# Patient Record
Sex: FEMALE | Race: BLACK OR AFRICAN AMERICAN | NOT HISPANIC OR LATINO | Employment: OTHER | URBAN - METROPOLITAN AREA
[De-identification: names, ages, dates, MRNs, and addresses within clinical notes are randomized per-mention and may not be internally consistent; named-entity substitution may affect disease eponyms.]

---

## 2017-01-18 ENCOUNTER — OFFICE VISIT (OUTPATIENT)
Dept: OPHTHALMOLOGY | Facility: CLINIC | Age: 67
End: 2017-01-18
Payer: MEDICARE

## 2017-01-18 DIAGNOSIS — Z96.1 PSEUDOPHAKIA OF BOTH EYES: ICD-10-CM

## 2017-01-18 DIAGNOSIS — H04.123 DRY EYES, BILATERAL: ICD-10-CM

## 2017-01-18 DIAGNOSIS — H40.1132 PRIMARY OPEN ANGLE GLAUCOMA OF BOTH EYES, MODERATE STAGE: Primary | ICD-10-CM

## 2017-01-18 PROCEDURE — 99999 PR PBB SHADOW E&M-EST. PATIENT-LVL II: CPT | Mod: PBBFAC,,, | Performed by: OPHTHALMOLOGY

## 2017-01-18 PROCEDURE — 92012 INTRM OPH EXAM EST PATIENT: CPT | Mod: S$GLB,,, | Performed by: OPHTHALMOLOGY

## 2017-01-18 RX ORDER — KETOROLAC TROMETHAMINE 5 MG/ML
SOLUTION OPHTHALMIC
Qty: 1 BOTTLE | Refills: 1 | Status: SHIPPED | OUTPATIENT
Start: 2017-01-18 | End: 2018-02-22 | Stop reason: ALTCHOICE

## 2017-01-18 NOTE — PROGRESS NOTES
SUBJECTIVE:   Gris Jin is a 66 y.o. female   Uncorrected distance visual acuity was 20/25 in the right eye and 20/20 -2 in the left eye.   Chief Complaint   Patient presents with    Glaucoma    Dry Eye        HPI:  HPI     Here for IOP check and dry eye check.  She states she didn't try Xiidra   due to cost.(300).  She states vision gets blurry after reading for a   time.  She thinks she should use Systane more often, but she forgets.      1. Mod COAG OS>OD Goal=15-16  SLT OD 8/07 (25-16)  2. Dry eyes  3. Restor IOL OU w/YAG OS (readers for fine print)  4. DM x 2010    Timolol BID OU  Xalatan QHS OU  Systane PRN OU  O3FO       Last edited by Celi Sanders on 1/18/2017  4:39 PM.     Assessment /Plan :  1. Primary open angle glaucoma of both eyes, moderate stage IOP not within acceptable range relative to target IOP with risk of irreversible visual loss. Better IOP control is recommended. Discussed options, risks, and benefits of additional medication, SLT laser, and/or incisional glaucoma surgery. Reviewed importance of continued compliance with treatment and follow up.     Patient chooses schedule SLT  OU     2. Pseudophakia of both eyes doing well   3.      Dry Eyes continue artificial tears prn OU    Return for the SLT OU

## 2017-01-18 NOTE — MR AVS SNAPSHOT
[facility] - Ophthalmology  [address] [facility] [place] [address]  Phone: [phone]  Fax: [phone]                  Gris Jin   2017 4:00 PM   Office Visit    Description:  Female : 1950   Provider:  Harrison Treadwell MD   Department:  [facility] - Ophthalmology           Reason for Visit     Glaucoma     Dry Eye           Diagnoses this Visit        Comments    Primary open angle glaucoma of both eyes, moderate stage    -  Primary     Pseudophakia of both eyes         Dry eyes, bilateral                To Do List           Goals (5 Years of Data)     None       These Medications        Disp Refills Start End    ketorolac 0.5% (ACULAR) 0.5 % Drop 1 Bottle 1 2017     Put one eyedrop in both eye four times a day. Start first drop morning of laser and stop after five days.    Pharmacy: [facility] [address] [place] [address] [address] [facility] #: [phone]         [facility] On Call     [facility] Care [facility] -  Assistance  Registered nurses in the [facility] On Call Center provide clinical advisement, health education, appointment booking, and other advisory services.  Call for this free service at [phone].             Medications           Message regarding Medications     Verify the changes and/or additions to your medication regime listed below are the same as discussed with your clinician today.  If any of these changes or additions are incorrect, please notify your healthcare provider.        START taking these NEW medications        Refills    ketorolac 0.5% (ACULAR) 0.5 % Drop 1    Sig: Put one eyedrop in both eye four times a day. Start first drop morning of laser and stop after five days.    Class: Normal           Verify that the below list of medications is an accurate representation of the medications you are currently taking.  If none reported, the list may be blank. If incorrect, please contact your healthcare provider. Carry this list  with you in case of emergency.           Current Medications     aspirin (ECOTRIN) 81 MG EC tablet Take 81 mg by mouth.    escitalopram oxalate (LEXAPRO) 10 MG tablet     fish oil-omega-3 fatty acids 300-1,000 mg capsule Take 2 g by mouth once daily.    latanoprost 0.005 % ophthalmic solution Place 1 drop into both eyes every evening.    lisinopril 10 MG tablet Take 5 mg by mouth.    losartan (COZAAR) 25 MG tablet Take 12.5 mg by mouth.    lovastatin (MEVACOR) 20 MG tablet     metformin (GLUCOPHAGE) 1000 MG tablet     pravastatin (PRAVACHOL) 20 MG tablet Take 20 mg by mouth.    sitagliptin-metformin 50-1,000 mg TM24 Take by mouth.    timolol maleate 0.5% (TIMOPTIC) 0.5 % Drop Place 1 drop into both eyes 2 (two) times daily.    zolpidem (AMBIEN) 10 mg Tab     ketorolac 0.5% (ACULAR) 0.5 % Drop Put one eyedrop in both eye four times a day. Start first drop morning of laser and stop after five days.    lifitegrast 5 % Dpet Apply 1 drop to eye 2 (two) times daily.           Clinical Reference Information           Allergies as of 1/18/2017     No Known Allergies      Immunizations Administered on Date of Encounter - 1/18/2017     None      Orders Placed During Today's Visit      Normal Orders This Visit    Ambulatory Referral to External Surgery  for SLT OU

## 2017-03-15 ENCOUNTER — OFFICE VISIT (OUTPATIENT)
Dept: OPHTHALMOLOGY | Facility: CLINIC | Age: 67
End: 2017-03-15
Payer: MEDICARE

## 2017-03-15 DIAGNOSIS — H40.1132 PRIMARY OPEN ANGLE GLAUCOMA OF BOTH EYES, MODERATE STAGE: Primary | ICD-10-CM

## 2017-03-15 DIAGNOSIS — Z96.1 PSEUDOPHAKIA OF BOTH EYES: ICD-10-CM

## 2017-03-15 PROCEDURE — 92012 INTRM OPH EXAM EST PATIENT: CPT | Mod: S$GLB,,, | Performed by: OPHTHALMOLOGY

## 2017-03-15 PROCEDURE — 99999 PR PBB SHADOW E&M-EST. PATIENT-LVL II: CPT | Mod: PBBFAC,,, | Performed by: OPHTHALMOLOGY

## 2017-03-15 NOTE — MR AVS SNAPSHOT
[facility] - Ophthalmology  [address] [facility] [place] [address]  Phone: [phone]  Fax: [phone]                  Gris Jin   3/15/2017 4:00 PM   Office Visit    Description:  Female : 1950   Provider:  Harrison Treadwell MD   Department:  [facility] - Ophthalmology           Reason for Visit     Glaucoma           Diagnoses this Visit        Comments    Primary open angle glaucoma of both eyes, moderate stage    -  Primary     Pseudophakia of both eyes                To Do List           Goals (5 Years of Data)     None      [facility]     [facility] Nurse Care [facility] -  Assistance  Registered nurses in the [facility] On Call Center provide clinical advisement, health education, appointment booking, and other advisory services.  Call for this free service at [phone].             Medications           Message regarding Medications     Verify the changes and/or additions to your medication regime listed below are the same as discussed with your clinician today.  If any of these changes or additions are incorrect, please notify your healthcare provider.             Verify that the below list of medications is an accurate representation of the medications you are currently taking.  If none reported, the list may be blank. If incorrect, please contact your healthcare provider. Carry this list with you in case of emergency.           Current Medications     aspirin (ECOTRIN) 81 MG EC tablet Take 81 mg by mouth.    fish oil-omega-3 fatty acids 300-1,000 mg capsule Take 2 g by mouth once daily.    latanoprost 0.005 % ophthalmic solution Place 1 drop into both eyes every evening.    lisinopril 10 MG tablet Take 5 mg by mouth.    losartan (COZAAR) 25 MG tablet Take 12.5 mg by mouth.    lovastatin (MEVACOR) 20 MG tablet     metformin (GLUCOPHAGE) 1000 MG tablet     timolol maleate 0.5% (TIMOPTIC) 0.5 % Drop Place 1 drop into both eyes 2 (two) times daily.    zolpidem  (AMBIEN) 10 mg Tab     escitalopram oxalate (LEXAPRO) 10 MG tablet     ketorolac 0.5% (ACULAR) 0.5 % Drop Put one eyedrop in both eye four times a day. Start first drop morning of laser and stop after five days.    lifitegrast 5 % Dpet Apply 1 drop to eye 2 (two) times daily.    pravastatin (PRAVACHOL) 20 MG tablet Take 20 mg by mouth.    sitagliptin-metformin 50-1,000 mg TM24 Take by mouth.           Clinical Reference Information           Allergies as of 3/15/2017     No Known Allergies      Immunizations Administered on Date of Encounter - 3/15/2017     None      [facility] Sign-Up     Activating your [facility] account is as easy as 1-2-3!     1) Visit [email or web address], select Sign Up Now, enter this activation code and your date of birth, then select Next.  Activation code not generated  Current Patient Portal Status: Account disabled      2) Create a username and password to use when you visit [facility] in the future and select a security question in case you lose your password and select Next.    3) Enter your e-mail address and click Sign Up!    Additional Information  If you have questions, please e-mail [email or web address] or call [phone] to talk to our [facility] staff. Remember, [facility] is NOT to be used for urgent needs. For medical emergencies, dial 911.         Language Assistance Services     ATTENTION: Language assistance services are available, free of charge. Please call [phone].      ATENCIÓN: Si habla español, tiene a mayes disposición servicios gratuitos de asistencia lingüística. Llame al [phone].     CHÚ Ý: N?u b?n nói Ti?ng Vi?t, có các d?ch v? h? tr? ngôn ng? mi?n phí dành cho b?n. G?i s? [phone].         [facility] - [facility] complies with applicable Federal civil rights laws and does not discriminate on the basis of race, color, national origin, age, disability, or sex.

## 2017-03-15 NOTE — PROGRESS NOTES
SUBJECTIVE:   Gris Jin is a 66 y.o. female   Uncorrected distance visual acuity was 20/25 in the right eye and 20/20 in the left eye.   Chief Complaint   Patient presents with    Glaucoma     5 wk s/p SLT OU         HPI:  HPI     Glaucoma    Additional comments: 5 wk s/p SLT OU            Comments   Pt has no complaints of pain or discomfort. VA stable. 100% compliant with   gtts.    Brittany Chao NP    1. Mod COAG OS>OD Goal=15-16  SLT OD 8/07 (25-16)  SLT OU 2/15/17  2. Dry eyes  3. Restor IOL OU w/YAG OS (readers for fine print)  4. DM x 2010    Timolol BID OU  Xalatan QHS OU  Systane PRN OU  O3FO       Last edited by Jayce Coto, Patient Care Assistant on 3/15/2017  4:05   PM. (History)        Assessment /Plan :  1. Primary open angle glaucoma of both eyes, moderate stage  Nice response to the SLT OU  Doing well, IOP within acceptable range relative to target IOP and no evidence of progression. Continue current treatment. Reviewed importance of continued compliance with treatment and follow up.      2. Pseudophakia of both eyes doing well       Return to clinic in 3 months  or as needed.  With 24-2 HVF, Dilation and SDP's

## 2017-04-01 DIAGNOSIS — H40.1190 CHRONIC OPEN ANGLE GLAUCOMA: ICD-10-CM

## 2017-04-03 RX ORDER — LATANOPROST 50 UG/ML
SOLUTION/ DROPS OPHTHALMIC
Qty: 1 BOTTLE | Refills: 12 | Status: SHIPPED | OUTPATIENT
Start: 2017-04-03 | End: 2018-02-22 | Stop reason: SDUPTHER

## 2017-04-13 DIAGNOSIS — H40.1190 CHRONIC OPEN ANGLE GLAUCOMA: ICD-10-CM

## 2017-04-17 RX ORDER — LATANOPROST 50 UG/ML
SOLUTION/ DROPS OPHTHALMIC
Qty: 3 ML | Refills: 11 | Status: SHIPPED | OUTPATIENT
Start: 2017-04-17 | End: 2018-09-06

## 2017-05-12 DIAGNOSIS — H40.1190 CHRONIC OPEN ANGLE GLAUCOMA: ICD-10-CM

## 2017-05-12 RX ORDER — TIMOLOL MALEATE 5 MG/ML
SOLUTION/ DROPS OPHTHALMIC
Qty: 5 ML | Refills: 11 | Status: SHIPPED | OUTPATIENT
Start: 2017-05-12 | End: 2018-05-31 | Stop reason: SDUPTHER

## 2017-08-31 ENCOUNTER — OFFICE VISIT (OUTPATIENT)
Dept: OPHTHALMOLOGY | Facility: CLINIC | Age: 67
End: 2017-08-31
Payer: MEDICARE

## 2017-08-31 DIAGNOSIS — H57.89 IRRITATION OF BOTH EYES: Primary | ICD-10-CM

## 2017-08-31 PROCEDURE — 92012 INTRM OPH EXAM EST PATIENT: CPT | Mod: S$GLB,,, | Performed by: OPTOMETRIST

## 2017-08-31 PROCEDURE — 99999 PR PBB SHADOW E&M-EST. PATIENT-LVL II: CPT | Mod: PBBFAC,,, | Performed by: OPTOMETRIST

## 2017-08-31 NOTE — PROGRESS NOTES
HPI     Last CPG exam 03/15/2017  On yesterday patient placed liquid paper solution in both eye  Patient rinsed eyes and went to the ER to be checked  Patient is coming today to make sure that no damage was done to the eyes    Diabetic   COAG  Pseudophakia, OU    MLC:      Last edited by EFFIE Díaz, OD on 8/31/2017  2:47 PM. (History)            Assessment /Plan     For exam results, see Encounter Report.    Irritation of both eyes        No signs/symptoms now from getting the Liquid Paper in her eyes.  All is white and quiet now.  RTC prn.

## 2018-02-22 ENCOUNTER — OFFICE VISIT (OUTPATIENT)
Dept: OPHTHALMOLOGY | Facility: CLINIC | Age: 68
End: 2018-02-22
Payer: MEDICARE

## 2018-02-22 DIAGNOSIS — E11.9 DIABETES MELLITUS TYPE 2 WITHOUT RETINOPATHY: ICD-10-CM

## 2018-02-22 DIAGNOSIS — H40.1132 PRIMARY OPEN ANGLE GLAUCOMA OF BOTH EYES, MODERATE STAGE: Primary | ICD-10-CM

## 2018-02-22 DIAGNOSIS — Z96.1 PSEUDOPHAKIA OF BOTH EYES: ICD-10-CM

## 2018-02-22 PROCEDURE — 92250 FUNDUS PHOTOGRAPHY W/I&R: CPT | Mod: S$GLB,,, | Performed by: OPHTHALMOLOGY

## 2018-02-22 PROCEDURE — 92014 COMPRE OPH EXAM EST PT 1/>: CPT | Mod: S$GLB,,, | Performed by: OPHTHALMOLOGY

## 2018-02-22 PROCEDURE — 92083 EXTENDED VISUAL FIELD XM: CPT | Mod: S$GLB,,, | Performed by: OPHTHALMOLOGY

## 2018-02-22 PROCEDURE — 99999 PR PBB SHADOW E&M-EST. PATIENT-LVL II: CPT | Mod: PBBFAC,,, | Performed by: OPHTHALMOLOGY

## 2018-02-22 NOTE — LETTER
[facility] - Ophthalmology  [address]  [place] [address]  Phone: [phone]  Fax: [phone]   February 22, 2018    Kodak Guerra MD  [address]  [facility] [place] [address]    Patient: Gris Jin   MR Number: [id number]   YOB: 1950   Date of Visit: 2/22/2018       Dear Dr. Guerra :    Thank you for referring Gris Jin to me for evaluation. Here is my assessment and plan of care:          1. Primary open angle glaucoma of both eyes, moderate stage Doing well, IOP within acceptable range relative to target IOP and no evidence of progression. Continue current treatment. Reviewed importance of continued compliance with treatment and follow up.     2. Pseudophakia of both eyes  -- Condition stable, no therapeutic change required. Monitoring routinely.     3. Diabetes mellitus type 2 without retinopathy No diabetic retinopathy at this time. Reviewed diabetic eye precautions including avoiding tobacco use,  Good glucose control, and importance of regular follow up.        Return to clinic in 6 months  or as needed.  With IOP Check and GOCT          If you have questions, please do not hesitate to call me. I look forward to following Ms. Gris Jin along with you.    Sincerely,        Harrison Treadwell MD       CC  No Recipients

## 2018-02-22 NOTE — PROGRESS NOTES
SUBJECTIVE:   Gris Jin is a 67 y.o. female   Uncorrected distance visual acuity was 20/25 in the right eye and 20/20 in the left eye.   Chief Complaint   Patient presents with    Glaucoma     3 month HVF/FD, Timolol BID OU, Xalatan QHS OU        HPI:  HPI     Glaucoma    Additional comments: 3 month HVF/FD, Timolol BID OU, Xalatan QHS OU           Comments       Brittany Guerra PCP    1. Mod COAG OS>OD Goal=15-16  SLT OD 8/07 (25-16) + 2/15/17 (17.5-15.5)  SLT OS 2/15/17 (18-16)  2. Dry eyes  3. Restor IOL OU w/YAG OS (readers for fine print)  4. DM x 2010    Timolol BID OU  Xalatan QHS OU  Systane PRN OU  O3FO       Last edited by Dajuan Fried on 2/22/2018  2:21 PM. (History)        Assessment /Plan :  1. Primary open angle glaucoma of both eyes, moderate stage Doing well, IOP within acceptable range relative to target IOP and no evidence of progression. Continue current treatment. Reviewed importance of continued compliance with treatment and follow up.     2. Pseudophakia of both eyes  -- Condition stable, no therapeutic change required. Monitoring routinely.     3. Diabetes mellitus type 2 without retinopathy No diabetic retinopathy at this time. Reviewed diabetic eye precautions including avoiding tobacco use,  Good glucose control, and importance of regular follow up.        Return to clinic in 6 months  or as needed.  With IOP Check and GOCT

## 2018-04-13 DIAGNOSIS — H40.1190 CHRONIC OPEN ANGLE GLAUCOMA: ICD-10-CM

## 2018-04-13 RX ORDER — LATANOPROST 50 UG/ML
SOLUTION/ DROPS OPHTHALMIC
Qty: 1 BOTTLE | Refills: 12 | Status: SHIPPED | OUTPATIENT
Start: 2018-04-13 | End: 2018-08-22 | Stop reason: SDUPTHER

## 2018-05-31 DIAGNOSIS — H40.1190 CHRONIC OPEN ANGLE GLAUCOMA: ICD-10-CM

## 2018-05-31 RX ORDER — TIMOLOL MALEATE 5 MG/ML
SOLUTION/ DROPS OPHTHALMIC
Qty: 5 ML | Refills: 11 | Status: SHIPPED | OUTPATIENT
Start: 2018-05-31 | End: 2019-06-03 | Stop reason: SDUPTHER

## 2018-08-22 ENCOUNTER — OFFICE VISIT (OUTPATIENT)
Dept: OPHTHALMOLOGY | Facility: CLINIC | Age: 68
End: 2018-08-22
Payer: MEDICARE

## 2018-08-22 DIAGNOSIS — H40.1132 PRIMARY OPEN ANGLE GLAUCOMA OF BOTH EYES, MODERATE STAGE: Primary | ICD-10-CM

## 2018-08-22 DIAGNOSIS — H04.123 DRY EYES, BILATERAL: ICD-10-CM

## 2018-08-22 DIAGNOSIS — Z96.1 PSEUDOPHAKIA OF BOTH EYES: ICD-10-CM

## 2018-08-22 PROCEDURE — 99999 PR PBB SHADOW E&M-EST. PATIENT-LVL II: CPT | Mod: PBBFAC,,, | Performed by: OPHTHALMOLOGY

## 2018-08-22 PROCEDURE — 92012 INTRM OPH EXAM EST PATIENT: CPT | Mod: S$GLB,,, | Performed by: OPHTHALMOLOGY

## 2018-08-22 PROCEDURE — 92133 CPTRZD OPH DX IMG PST SGM ON: CPT | Mod: S$GLB,,, | Performed by: OPHTHALMOLOGY

## 2018-08-22 RX ORDER — BIMATOPROST 0.1 MG/ML
1 SOLUTION/ DROPS OPHTHALMIC NIGHTLY
Qty: 5 ML | Refills: 11
Start: 2018-08-22 | End: 2018-09-06 | Stop reason: SDUPTHER

## 2018-08-22 NOTE — PROGRESS NOTES
SUBJECTIVE:   Gris Jin is a 67 y.o. female   Uncorrected distance visual acuity was 20/30 -2 in the right eye and 20/25 +2 in the left eye.   Chief Complaint   Patient presents with    Glaucoma     6 month GOCT and IOP check, Timolol BID OU, Xalatan QHS OU        HPI:  HPI     Glaucoma      Additional comments: 6 month GOCT and IOP check, Timolol BID OU, Xalatan   QHS OU              Comments     Pt states she's been using her drops as directed. No ocular pain or   irritation. Vision is about the same, only use readers for small print.     Brittany Chao NP  Dr. Kodak Guerra PCP    1. Mod COAG OS>OD Goal=15-16  SLT OD 8/07 (25-16) + 2/15/17 (17.5-15.5)  SLT OS 2/15/17 (18-16)  2. Dry eyes  3. Restor IOL OU w/YAG OS (readers for fine print)  4. DM x 2010    Timolol BID OU  Xalatan QHS OU  Systane PRN OU  O3FO          Last edited by Dajuan Fried on 8/22/2018  2:57 PM. (History)        Assessment /Plan :  1. Primary open angle glaucoma of both eyes, moderate stage IOP not within acceptable range relative to target IOP with risk of irreversible visual loss. Better IOP control is recommended. Discussed options, risks, and benefits of additional medication, SLT laser, and/or incisional glaucoma surgery. Reviewed importance of continued compliance with treatment and follow up.     Patient chooses change Xalatan to lumigan 0.01 qhs ou     2. Pseudophakia of both eyes  -- Condition stable, no therapeutic change required. Monitoring routinely.     3. Dry eyes, bilateral Resume Xiidra BID OU          Return to clinic in 2-3 weeks  or as needed.  With IOP Check

## 2018-09-06 ENCOUNTER — OFFICE VISIT (OUTPATIENT)
Dept: OPHTHALMOLOGY | Facility: CLINIC | Age: 68
End: 2018-09-06
Payer: MEDICARE

## 2018-09-06 DIAGNOSIS — Z96.1 PSEUDOPHAKIA OF BOTH EYES: ICD-10-CM

## 2018-09-06 DIAGNOSIS — H04.123 DRY EYES, BILATERAL: ICD-10-CM

## 2018-09-06 DIAGNOSIS — H40.1132 PRIMARY OPEN ANGLE GLAUCOMA OF BOTH EYES, MODERATE STAGE: Primary | ICD-10-CM

## 2018-09-06 PROCEDURE — 99999 PR PBB SHADOW E&M-EST. PATIENT-LVL I: CPT | Mod: PBBFAC,,, | Performed by: OPHTHALMOLOGY

## 2018-09-06 PROCEDURE — 92012 INTRM OPH EXAM EST PATIENT: CPT | Mod: S$PBB,,, | Performed by: OPHTHALMOLOGY

## 2018-09-06 PROCEDURE — 99211 OFF/OP EST MAY X REQ PHY/QHP: CPT | Mod: PBBFAC,PO | Performed by: OPHTHALMOLOGY

## 2018-09-06 RX ORDER — BIMATOPROST 0.1 MG/ML
1 SOLUTION/ DROPS OPHTHALMIC NIGHTLY
Qty: 5 ML | Refills: 11 | Status: SHIPPED | OUTPATIENT
Start: 2018-09-06 | End: 2019-03-14 | Stop reason: SDUPTHER

## 2018-09-06 NOTE — PROGRESS NOTES
SUBJECTIVE:   Gris Jin is a 67 y.o. female   Uncorrected distance visual acuity was 20/30 -2 in the right eye and 20/25 -2 in the left eye.   Chief Complaint   Patient presents with    Glaucoma     2-3 wk IOP ck         HPI:  HPI     Glaucoma      Additional comments: 2-3 wk IOP ck               Comments       1. Mod COAG OS>OD Goal=15-16  SLT OD 8/07 (25-16) + 2/15/17 (17.5-15.5)  SLT OS 2/15/17 (18-16)  2. Dry eyes  3. Restor IOL OU w/YAG OS (readers for fine print)  4. DM x 2010    Timolol BID OU  Lumigan 0.01 QHS OU  Xiidra bid OU   Systane PRN OU  O3FO          Last edited by Kamla Ann MA on 9/6/2018  3:25 PM. (History)        Assessment /Plan :  1. Primary open angle glaucoma of both eyes, moderate stage IOP not within acceptable range relative to target IOP with risk of irreversible visual loss. Better IOP control is recommended. Discussed options, risks, and benefits of additional medication, SLT laser, and/or incisional glaucoma surgery. Reviewed importance of continued compliance with treatment and follow up.      IOP has improved since last visit   Patient chooses defer and recheck IOP next visit     2. Pseudophakia of both eyes  -- Condition stable, no therapeutic change required. Monitoring routinely.     3. Dry eyes, bilateral cont xiidra       Return to clinic in 3 months  or as needed.  With IOP Check

## 2018-12-06 ENCOUNTER — OFFICE VISIT (OUTPATIENT)
Dept: OPHTHALMOLOGY | Facility: CLINIC | Age: 68
End: 2018-12-06
Payer: MEDICARE

## 2018-12-06 DIAGNOSIS — H40.1132 PRIMARY OPEN ANGLE GLAUCOMA OF BOTH EYES, MODERATE STAGE: Primary | ICD-10-CM

## 2018-12-06 DIAGNOSIS — H04.123 DRY EYES, BILATERAL: ICD-10-CM

## 2018-12-06 DIAGNOSIS — Z96.1 PSEUDOPHAKIA OF BOTH EYES: ICD-10-CM

## 2018-12-06 PROCEDURE — 92012 INTRM OPH EXAM EST PATIENT: CPT | Mod: S$GLB,,, | Performed by: OPHTHALMOLOGY

## 2018-12-06 PROCEDURE — 99999 PR PBB SHADOW E&M-EST. PATIENT-LVL II: CPT | Mod: PBBFAC,,, | Performed by: OPHTHALMOLOGY

## 2018-12-06 NOTE — PROGRESS NOTES
SUBJECTIVE:   Gris Jin is a 68 y.o. female   Uncorrected distance visual acuity was 20/30 -2 in the right eye and 20/20 -1 in the left eye.   Chief Complaint   Patient presents with    Glaucoma     3 mth IOP check         HPI:  HPI     Glaucoma      Additional comments: 3 mth IOP check               Comments       1. Mod COAG OS>OD Goal=15-16  SLT OD 8/07 (25-16) + 2/15/17 (17.5-15.5)  SLT OS 2/15/17 (18-16)  2. Dry eyes  3. Restor IOL OU w/YAG OS (readers for fine print)  4. DM x 2010    Timolol BID OU  Lumigan 0.01 QHS OU  Xiidra bid OU   Systane PRN OU  O3FO          Last edited by Kamla Ann MA on 12/6/2018  3:14 PM. (History)        Assessment /Plan :  1. Primary open angle glaucoma of both eyes, moderate stage IOP not within acceptable range relative to target IOP with risk of irreversible visual loss. Better IOP control is recommended. Discussed options, risks, and benefits of additional medication, SLT laser, and/or incisional glaucoma surgery. Reviewed importance of continued compliance with treatment and follow up.     Patient chooses defer and recheck IOP next visit     2. Pseudophakia of both eyes  -- Condition stable, no therapeutic change required. Monitoring routinely.     3. Dry eyes, bilateral continue Xiidra OU BID and artificial tears prn OU     Return to clinic in 3 months  or as needed.  With Dilation, HVF 24-2 and SDP

## 2019-03-14 ENCOUNTER — OFFICE VISIT (OUTPATIENT)
Dept: OPHTHALMOLOGY | Facility: CLINIC | Age: 69
End: 2019-03-14
Payer: MEDICARE

## 2019-03-14 DIAGNOSIS — E11.9 DIABETES MELLITUS TYPE 2 WITHOUT RETINOPATHY: ICD-10-CM

## 2019-03-14 DIAGNOSIS — H40.1132 PRIMARY OPEN ANGLE GLAUCOMA OF BOTH EYES, MODERATE STAGE: Primary | ICD-10-CM

## 2019-03-14 DIAGNOSIS — Z96.1 PSEUDOPHAKIA OF BOTH EYES: ICD-10-CM

## 2019-03-14 DIAGNOSIS — H04.123 DRY EYES, BILATERAL: ICD-10-CM

## 2019-03-14 PROCEDURE — 92014 COMPRE OPH EXAM EST PT 1/>: CPT | Mod: S$GLB,,, | Performed by: OPHTHALMOLOGY

## 2019-03-14 PROCEDURE — 92250 FUNDUS PHOTOGRAPHY W/I&R: CPT | Mod: S$GLB,,, | Performed by: OPHTHALMOLOGY

## 2019-03-14 PROCEDURE — 92083 HUMPHREY VISUAL FIELD - OU - BOTH EYES: ICD-10-PCS | Mod: S$GLB,,, | Performed by: OPHTHALMOLOGY

## 2019-03-14 PROCEDURE — 92250 COLOR FUNDUS PHOTOGRAPHY - OU - BOTH EYES: ICD-10-PCS | Mod: S$GLB,,, | Performed by: OPHTHALMOLOGY

## 2019-03-14 PROCEDURE — 92083 EXTENDED VISUAL FIELD XM: CPT | Mod: S$GLB,,, | Performed by: OPHTHALMOLOGY

## 2019-03-14 PROCEDURE — 99999 PR PBB SHADOW E&M-EST. PATIENT-LVL II: CPT | Mod: PBBFAC,,, | Performed by: OPHTHALMOLOGY

## 2019-03-14 PROCEDURE — 99999 PR PBB SHADOW E&M-EST. PATIENT-LVL II: ICD-10-PCS | Mod: PBBFAC,,, | Performed by: OPHTHALMOLOGY

## 2019-03-14 PROCEDURE — 92014 PR EYE EXAM, EST PATIENT,COMPREHESV: ICD-10-PCS | Mod: S$GLB,,, | Performed by: OPHTHALMOLOGY

## 2019-03-14 RX ORDER — BIMATOPROST 0.1 MG/ML
1 SOLUTION/ DROPS OPHTHALMIC NIGHTLY
Qty: 2.5 ML | Refills: 11 | Status: SHIPPED | OUTPATIENT
Start: 2019-03-14 | End: 2021-05-25

## 2019-03-14 NOTE — LETTER
[facility]  [address] [facility]  [place] [address]  Phone: [phone]  Fax: [phone]   June 12, 2019    Kodak Guerra MD  [address]  [facility] [address]  [place] [address]    Patient: Gris Jin   MR Number: [id number]   YOB: 1950   Date of Visit: 3/14/2019       Dear Dr. Guerra :    Thank you for referring Gris Jin to me for evaluation. Here is my assessment and plan of care:          1. Primary open angle glaucoma of both eyes, moderate stage IOP not within acceptable range relative to target IOP with risk of irreversible visual loss. Better IOP control is recommended. Discussed options, risks, and benefits of additional medication, SLT laser, and/or incisional glaucoma surgery. Reviewed importance of continued compliance with treatment and follow up.     Patient chooses to add Rhopressa OU qhs   Continue Timolol BID OU and Lumigan qhs OU     2. Pseudophakia of both eyes stable   3. Dry eyes, bilateral  -- Condition stable, no therapeutic change required. Monitoring routinely.     4. Diabetes mellitus type 2 without retinopathy No diabetic retinopathy at this time. Reviewed diabetic eye precautions including avoiding tobacco use,  Good glucose control, and importance of regular follow up.        Return to clinic in 2-3 weeks  or as needed.  With IOP Check                  If you have questions, please do not hesitate to call me. I look forward to following Ms. Gris Jin along with you.    Sincerely,        Harrison Treadwell MD       CC  No Recipients

## 2019-03-14 NOTE — PROGRESS NOTES
SUBJECTIVE:   Gris Jin is a 68 y.o. female   Uncorrected distance visual acuity was 20/25 -2 in the right eye and 20/20 -1 in the left eye.   Chief Complaint   Patient presents with    Glaucoma     3 month HVF/FD, OU: Timolol BID, Lumigan 0.01% QHS    Dry Eye     systane prn ou        HPI:  HPI     Glaucoma      Additional comments: 3 month HVF/FD, OU: Timolol BID, Lumigan 0.01% QHS              Dry Eye      Additional comments: systane prn ou              Comments     Pt states she's only using the systane for her dryness, the Xiidra was   too expensive. She's using her glaucoma drops as directed. She says Dr. Treadwell changed her drops to a more expensive drop, if her pressure is   still not where he want it to be she would like to go back to her previous   drops. No ocular pain or irritation. Vision is about the same.     Brittany Guerra PCP    1. Mod COAG OS>OD Goal=15-16  SLT OD 8/07 (25-16) + 2/15/17 (17.5-15.5)  SLT OS 2/15/17 (18-16)  2. Dry eyes  3. Restor IOL OU w/YAG OS (readers for fine print)  4. DM x 2010    Timolol BID OU  Lumigan 0.01 QHS OU  Xiidra bid OU   Systane PRN OU  O3FO          Last edited by Dajuan Fried on 3/14/2019  2:51 PM. (History)        Assessment /Plan :  1. Primary open angle glaucoma of both eyes, moderate stage IOP not within acceptable range relative to target IOP with risk of irreversible visual loss. Better IOP control is recommended. Discussed options, risks, and benefits of additional medication, SLT laser, and/or incisional glaucoma surgery. Reviewed importance of continued compliance with treatment and follow up.     Patient chooses to add Rhopressa OU qhs   Continue Timolol BID OU and Lumigan qhs OU     2. Pseudophakia of both eyes stable   3. Dry eyes, bilateral  -- Condition stable, no therapeutic change required. Monitoring routinely.     4. Diabetes mellitus type 2 without retinopathy No diabetic retinopathy at this time.  Reviewed diabetic eye precautions including avoiding tobacco use,  Good glucose control, and importance of regular follow up.        Return to clinic in 2-3 weeks  or as needed.  With IOP Check

## 2019-04-18 ENCOUNTER — OFFICE VISIT (OUTPATIENT)
Dept: OPHTHALMOLOGY | Facility: CLINIC | Age: 69
End: 2019-04-18
Payer: MEDICARE

## 2019-04-18 DIAGNOSIS — H04.123 DRY EYES, BILATERAL: ICD-10-CM

## 2019-04-18 DIAGNOSIS — Z96.1 PSEUDOPHAKIA OF BOTH EYES: ICD-10-CM

## 2019-04-18 DIAGNOSIS — H40.1132 PRIMARY OPEN ANGLE GLAUCOMA OF BOTH EYES, MODERATE STAGE: Primary | ICD-10-CM

## 2019-04-18 PROCEDURE — 99999 PR PBB SHADOW E&M-EST. PATIENT-LVL II: ICD-10-PCS | Mod: PBBFAC,,, | Performed by: OPHTHALMOLOGY

## 2019-04-18 PROCEDURE — 92012 PR EYE EXAM, EST PATIENT,INTERMED: ICD-10-PCS | Mod: S$GLB,,, | Performed by: OPHTHALMOLOGY

## 2019-04-18 PROCEDURE — 92012 INTRM OPH EXAM EST PATIENT: CPT | Mod: S$GLB,,, | Performed by: OPHTHALMOLOGY

## 2019-04-18 PROCEDURE — 99999 PR PBB SHADOW E&M-EST. PATIENT-LVL II: CPT | Mod: PBBFAC,,, | Performed by: OPHTHALMOLOGY

## 2019-04-18 RX ORDER — DORZOLAMIDE HYDROCHLORIDE AND TIMOLOL MALEATE 20; 5 MG/ML; MG/ML
1 SOLUTION/ DROPS OPHTHALMIC EVERY 12 HOURS
Qty: 1 BOTTLE | Refills: 12
Start: 2019-04-18 | End: 2019-05-31 | Stop reason: SDUPTHER

## 2019-04-18 NOTE — PROGRESS NOTES
SUBJECTIVE:   Gris Jin is a 68 y.o. female   Uncorrected distance visual acuity was 20/30 in the right eye and 20/25 in the left eye.   Chief Complaint   Patient presents with    Glaucoma        HPI:  HPI     2 week iop check since starting Rhopressa per pt OU is very painful red   with FB sensation       Brittany Guerra PCP    1. Mod COAG OS>OD Goal=15-16  SLT OD 8/07 (25-16) + 2/15/17 (17.5-15.5)  SLT OS 2/15/17 (18-16)  2. Dry eyes  3. Restor IOL OU w/YAG OS (readers for fine print)  4. DM x 2010    Timolol BID OU  Lumigan 0.01 QHS OU  Rhopressa OU qhs  Xiidra bid OU(no longer using - too expensive)  Systane PRN OU  O3FO    Last edited by Akiko Mccurdy MA on 4/18/2019  1:10 PM. (History)        Assessment /Plan :  1. Primary open angle glaucoma of both eyes, moderate stage IOP has improved but due to pt intolerance of rhopressa will change timolol to dorzolamide/timolol and cont lumigan qhs ou D/C Rhopressa    2. Pseudophakia of both eyes  -- Condition stable, no therapeutic change required. Monitoring routinely.     3. Dry eyes, bilateral cont otc drops qid ou        RTC 6-8 weeks

## 2019-05-31 DIAGNOSIS — H40.1132 PRIMARY OPEN ANGLE GLAUCOMA OF BOTH EYES, MODERATE STAGE: ICD-10-CM

## 2019-05-31 RX ORDER — DORZOLAMIDE HYDROCHLORIDE AND TIMOLOL MALEATE 20; 5 MG/ML; MG/ML
1 SOLUTION/ DROPS OPHTHALMIC EVERY 12 HOURS
Qty: 1 BOTTLE | Refills: 12
Start: 2019-05-31 | End: 2019-06-03 | Stop reason: SDUPTHER

## 2019-05-31 NOTE — TELEPHONE ENCOUNTER
----- Message from Ally Zambrano sent at 5/31/2019 11:58 AM CDT -----  Contact: Patient   Patient would like a call back at [phone], Regards to some samples she got she stated that she is running out and wants to get some more until she can come in on 6/12/19.    Thanks  Td

## 2019-05-31 NOTE — TELEPHONE ENCOUNTER
Sent Rx for dorzolamide-timolol to Dr. Posadas to approve and forward to Charmaine Gardner per pt. Request.

## 2019-06-03 DIAGNOSIS — H40.1190 CHRONIC OPEN ANGLE GLAUCOMA: ICD-10-CM

## 2019-06-03 DIAGNOSIS — H40.1132 PRIMARY OPEN ANGLE GLAUCOMA OF BOTH EYES, MODERATE STAGE: ICD-10-CM

## 2019-06-03 RX ORDER — DORZOLAMIDE HYDROCHLORIDE AND TIMOLOL MALEATE 20; 5 MG/ML; MG/ML
1 SOLUTION/ DROPS OPHTHALMIC EVERY 12 HOURS
Qty: 1 BOTTLE | Refills: 12 | Status: SHIPPED | OUTPATIENT
Start: 2019-06-03 | End: 2020-07-13

## 2019-06-03 RX ORDER — TIMOLOL MALEATE 5 MG/ML
SOLUTION/ DROPS OPHTHALMIC
Qty: 10 ML | Refills: 11 | Status: SHIPPED | OUTPATIENT
Start: 2019-06-03 | End: 2021-05-25

## 2019-06-12 ENCOUNTER — OFFICE VISIT (OUTPATIENT)
Dept: OPHTHALMOLOGY | Facility: CLINIC | Age: 69
End: 2019-06-12
Payer: MEDICARE

## 2019-06-12 DIAGNOSIS — Z96.1 PSEUDOPHAKIA OF BOTH EYES: ICD-10-CM

## 2019-06-12 DIAGNOSIS — H40.1132 PRIMARY OPEN ANGLE GLAUCOMA OF BOTH EYES, MODERATE STAGE: Primary | ICD-10-CM

## 2019-06-12 DIAGNOSIS — H04.123 DRY EYES, BILATERAL: ICD-10-CM

## 2019-06-12 PROCEDURE — 92012 INTRM OPH EXAM EST PATIENT: CPT | Mod: S$GLB,,, | Performed by: OPHTHALMOLOGY

## 2019-06-12 PROCEDURE — 99999 PR PBB SHADOW E&M-EST. PATIENT-LVL II: ICD-10-PCS | Mod: PBBFAC,,, | Performed by: OPHTHALMOLOGY

## 2019-06-12 PROCEDURE — 99999 PR PBB SHADOW E&M-EST. PATIENT-LVL II: CPT | Mod: PBBFAC,,, | Performed by: OPHTHALMOLOGY

## 2019-06-12 PROCEDURE — 92012 PR EYE EXAM, EST PATIENT,INTERMED: ICD-10-PCS | Mod: S$GLB,,, | Performed by: OPHTHALMOLOGY

## 2019-06-12 NOTE — PROGRESS NOTES
SUBJECTIVE:   Gris Jin is a 68 y.o. female   Uncorrected distance visual acuity was 20/30 in the right eye and 20/25 -2 in the left eye.   Chief Complaint   Patient presents with    Glaucoma     IOP check        HPI:  HPI     Glaucoma      Additional comments: IOP check              Comments     Last seen by [facility] on 4/18/19 for IOP check  Patient here today for 6 week IOP check  Vision stable  Using drops as directed  No pain or discomfort  Brittany Guerra PCP    1. Mod COAG OS>OD Goal=15-16  SLT OD 8/07 (25-16) + 2/15/17 (17.5-15.5)  SLT OS 2/15/17 (18-16)  Rhopressa OU qhs-intolerant -fb sensation,redness  2. Dry eyes  3. Restor IOL OU w/YAG OS (readers for fine print)  4. DM x 2010    Dorzolamide/Timolol BID OU  Lumigan 0.01 QHS OU  Xiidra bid OU(no longer using - too expensive)  Systane PRN OU  O3FO          Last edited by Idania Ni, PCT on 6/12/2019  2:52 PM. (History)          Assessment /Plan :  1. Primary open angle glaucoma of both eyes, moderate stage Doing well, IOP within acceptable range relative to target IOP and no evidence of progression. Continue current treatment. Reviewed importance of continued compliance with treatment and follow up.     2. Pseudophakia of both eyes  -- Condition stable, no therapeutic change required. Monitoring routinely.     3. Dry eyes, bilateral  -- Condition stable, no therapeutic change required. Monitoring routinely.       Return to clinic in 3 months  or as needed.  With IOP Check and GOCT

## 2019-09-12 ENCOUNTER — OFFICE VISIT (OUTPATIENT)
Dept: OPHTHALMOLOGY | Facility: CLINIC | Age: 69
End: 2019-09-12
Payer: MEDICARE

## 2019-09-12 DIAGNOSIS — Z96.1 PSEUDOPHAKIA OF BOTH EYES: ICD-10-CM

## 2019-09-12 DIAGNOSIS — H40.1132 PRIMARY OPEN ANGLE GLAUCOMA OF BOTH EYES, MODERATE STAGE: Primary | ICD-10-CM

## 2019-09-12 DIAGNOSIS — H04.123 DRY EYES, BILATERAL: ICD-10-CM

## 2019-09-12 PROCEDURE — 92133 POSTERIOR SEGMENT OCT OPTIC NERVE(OCULAR COHERENCE TOMOGRAPHY) - OU - BOTH EYES: ICD-10-PCS | Mod: S$GLB,,, | Performed by: OPHTHALMOLOGY

## 2019-09-12 PROCEDURE — 99999 PR PBB SHADOW E&M-EST. PATIENT-LVL II: ICD-10-PCS | Mod: PBBFAC,,, | Performed by: OPHTHALMOLOGY

## 2019-09-12 PROCEDURE — 99999 PR PBB SHADOW E&M-EST. PATIENT-LVL II: CPT | Mod: PBBFAC,,, | Performed by: OPHTHALMOLOGY

## 2019-09-12 PROCEDURE — 92012 INTRM OPH EXAM EST PATIENT: CPT | Mod: S$GLB,,, | Performed by: OPHTHALMOLOGY

## 2019-09-12 PROCEDURE — 92133 CPTRZD OPH DX IMG PST SGM ON: CPT | Mod: S$GLB,,, | Performed by: OPHTHALMOLOGY

## 2019-09-12 PROCEDURE — 92012 PR EYE EXAM, EST PATIENT,INTERMED: ICD-10-PCS | Mod: S$GLB,,, | Performed by: OPHTHALMOLOGY

## 2019-09-12 RX ORDER — LOSARTAN POTASSIUM 25 MG/1
25 TABLET ORAL DAILY
COMMUNITY

## 2019-09-12 NOTE — PROGRESS NOTES
SUBJECTIVE:   Gris Jin is a 68 y.o. female   Uncorrected distance visual acuity was 20/25 in the right eye and 20/25 in the left eye.   Chief Complaint   Patient presents with    Glaucoma        HPI:  HPI     Patient returns for a 3 month iop check and Goct. Patient states she is   100% compliant with drop usage.        Brittany Guerra PCP (needs letters)    1. Mod COAG OS>OD Goal=15-16  SLT OD 8/07 (25-16) + 2/15/17 (17.5-15.5)  SLT OS 2/15/17 (18-16)  Rhopressa OU qhs-intolerant -fb sensation,redness  2. Dry eyes  3. Restor IOL OU w/YAG OS (readers for fine print)  4. DM x 2010    Dorzolamide/Timolol BID OU  Lumigan 0.01 QHS OU  Xiidra bid OU(no longer using - too expensive)  Systane PRN OU  O3FO    Last edited by CAITY Mcgee on 9/12/2019  4:12 PM. (History)        Assessment /Plan :  1. Primary open angle glaucoma of both eyes, moderate stage Doing well, IOP within acceptable range relative to target IOP and no evidence of progression. Continue current treatment. Reviewed importance of continued compliance with treatment and follow up.     2. Pseudophakia of both eyes  -- Condition stable, no therapeutic change required. Monitoring routinely.     3. Dry eyes, bilateral  -- Condition stable, no therapeutic change required. Monitoring routinely.       Return to clinic in 3 months  or as needed.  With IOP Check

## 2019-09-28 DIAGNOSIS — H40.1132 PRIMARY OPEN ANGLE GLAUCOMA OF BOTH EYES, MODERATE STAGE: ICD-10-CM

## 2019-09-30 RX ORDER — BIMATOPROST 0.1 MG/ML
SOLUTION/ DROPS OPHTHALMIC
Qty: 1 BOTTLE | Refills: 11 | Status: SHIPPED | OUTPATIENT
Start: 2019-09-30 | End: 2021-05-25

## 2019-12-12 ENCOUNTER — OFFICE VISIT (OUTPATIENT)
Dept: OPHTHALMOLOGY | Facility: CLINIC | Age: 69
End: 2019-12-12
Payer: MEDICARE

## 2019-12-12 DIAGNOSIS — H40.1132 PRIMARY OPEN ANGLE GLAUCOMA OF BOTH EYES, MODERATE STAGE: Primary | ICD-10-CM

## 2019-12-12 DIAGNOSIS — H04.123 DRY EYES, BILATERAL: ICD-10-CM

## 2019-12-12 DIAGNOSIS — Z96.1 PSEUDOPHAKIA OF BOTH EYES: ICD-10-CM

## 2019-12-12 PROCEDURE — 99999 PR PBB SHADOW E&M-EST. PATIENT-LVL II: CPT | Mod: PBBFAC,,, | Performed by: OPHTHALMOLOGY

## 2019-12-12 PROCEDURE — 92012 PR EYE EXAM, EST PATIENT,INTERMED: ICD-10-PCS | Mod: S$GLB,,, | Performed by: OPHTHALMOLOGY

## 2019-12-12 PROCEDURE — 92012 INTRM OPH EXAM EST PATIENT: CPT | Mod: S$GLB,,, | Performed by: OPHTHALMOLOGY

## 2019-12-12 PROCEDURE — 99999 PR PBB SHADOW E&M-EST. PATIENT-LVL II: ICD-10-PCS | Mod: PBBFAC,,, | Performed by: OPHTHALMOLOGY

## 2019-12-12 RX ORDER — PIOGLITAZONEHYDROCHLORIDE 30 MG/1
TABLET ORAL
COMMUNITY
Start: 2019-11-04

## 2019-12-12 NOTE — PROGRESS NOTES
SUBJECTIVE:   Gris Jin is a 69 y.o. female   Uncorrected distance visual acuity was 20/30 -1 in the right eye and 20/25 -1 in the left eye.   Chief Complaint   Patient presents with    Glaucoma     3 month IOP check         HPI:  HPI     Glaucoma      Additional comments: 3 month IOP check               Comments     Patient feels OU is doing well, some days are better then others visually   but overall no changes in VA, denies any pain or irritation and states   using drops as directed.      Brittany Chao NP  Dr. Kodak Guerra PCP (needs letters)    1. Mod COAG OS>OD Goal=15-16  SLT OD 8/07 (25-16) + 2/15/17 (17.5-15.5)  SLT OS 2/15/17 (18-16)  Rhopressa OU qhs-intolerant -fb sensation,redness  2. Dry eyes  3. Restor IOL OU w/YAG OS (readers for fine print)  4. DM x 2010    Dorzolamide/Timolol BID OU  Lumigan 0.01 QHS OU  Xiidra bid OU(no longer using - too expensive)  Systane PRN OU  O3FO          Last edited by Anna Ku, Patient Care Assistant on 12/12/2019  3:33   PM. (History)        Assessment /Plan :  1. Primary open angle glaucoma of both eyes, moderate stage IOP not within acceptable range relative to target IOP with risk of irreversible visual loss. Better IOP control is recommended. Discussed options, risks, and benefits of additional medication, SLT laser, and/or incisional glaucoma surgery. Reviewed importance of continued compliance with treatment and follow up.     Patient chooses defer and recheck IOP next visit     Pt had steroid injection 2 weeks ago possible increase due to injection      2. Pseudophakia of both eyes  -- Condition stable, no therapeutic change required. Monitoring routinely.     3. Dry eyes, bilateral  -- Condition stable, no therapeutic change required. Monitoring routinely.           Return to clinic in 3 months  or as needed.  With Dilation, HVF 24-2 and SDP

## 2020-05-04 ENCOUNTER — OFFICE VISIT (OUTPATIENT)
Dept: OPHTHALMOLOGY | Facility: CLINIC | Age: 70
End: 2020-05-04
Payer: MEDICARE

## 2020-05-04 DIAGNOSIS — H40.1132 PRIMARY OPEN ANGLE GLAUCOMA OF BOTH EYES, MODERATE STAGE: Primary | ICD-10-CM

## 2020-05-04 DIAGNOSIS — Z96.1 PSEUDOPHAKIA OF BOTH EYES: ICD-10-CM

## 2020-05-04 DIAGNOSIS — H02.053 TRICHIASIS OF EYELID OF RIGHT EYE, UNSPECIFIED EYELID: ICD-10-CM

## 2020-05-04 DIAGNOSIS — E11.9 DIABETES MELLITUS TYPE 2 WITHOUT RETINOPATHY: ICD-10-CM

## 2020-05-04 PROCEDURE — 92250 FUNDUS PHOTOGRAPHY W/I&R: CPT | Mod: S$GLB,,, | Performed by: OPHTHALMOLOGY

## 2020-05-04 PROCEDURE — 92014 COMPRE OPH EXAM EST PT 1/>: CPT | Mod: 25,S$GLB,, | Performed by: OPHTHALMOLOGY

## 2020-05-04 PROCEDURE — 67820 PR REVISE EYELASHES,FORCEPS: ICD-10-PCS | Mod: RT,S$GLB,, | Performed by: OPHTHALMOLOGY

## 2020-05-04 PROCEDURE — 67820 REVISE EYELASHES: CPT | Mod: RT,S$GLB,, | Performed by: OPHTHALMOLOGY

## 2020-05-04 PROCEDURE — 99999 PR PBB SHADOW E&M-EST. PATIENT-LVL II: CPT | Mod: PBBFAC,,, | Performed by: OPHTHALMOLOGY

## 2020-05-04 PROCEDURE — 92083 HUMPHREY VISUAL FIELD - OU - BOTH EYES: ICD-10-PCS | Mod: S$GLB,,, | Performed by: OPHTHALMOLOGY

## 2020-05-04 PROCEDURE — 92083 EXTENDED VISUAL FIELD XM: CPT | Mod: S$GLB,,, | Performed by: OPHTHALMOLOGY

## 2020-05-04 PROCEDURE — 99999 PR PBB SHADOW E&M-EST. PATIENT-LVL II: ICD-10-PCS | Mod: PBBFAC,,, | Performed by: OPHTHALMOLOGY

## 2020-05-04 PROCEDURE — 92014 PR EYE EXAM, EST PATIENT,COMPREHESV: ICD-10-PCS | Mod: 25,S$GLB,, | Performed by: OPHTHALMOLOGY

## 2020-05-04 PROCEDURE — 92250 COLOR FUNDUS PHOTOGRAPHY - OU - BOTH EYES: ICD-10-PCS | Mod: S$GLB,,, | Performed by: OPHTHALMOLOGY

## 2020-05-04 RX ORDER — CALCIUM CARBONATE 500(1250)
1250 TABLET ORAL
COMMUNITY
Start: 2019-12-30 | End: 2020-10-21

## 2020-05-04 NOTE — PROGRESS NOTES
SUBJECTIVE  Gris TIAN Jin is 69 y.o. female  Uncorrected distance visual acuity was 20/25 in the right eye and 20/20 -1 in the left eye.   Chief Complaint   Patient presents with    Glaucoma     5 month IOP check with HVF and SDP          HPI     Glaucoma      Additional comments: 5 month IOP check with HVF and SDP              Comments     Patient feels OU is doing well, no changes in VA and states using drops   as directed.  Patient needs a report sent to her PCP regarding today's   findings.   this morning and had bloodwork done last Thursday so   she is not sure what her A1C is currently.      Brittany Guerra PCP (needs letters)    1. Mod COAG OS>OD Goal=15-16  SLT OD 8/07 (25-16) + 2/15/17 (17.5-15.5)  SLT OS 2/15/17 (18-16)  Rhopressa OU qhs-intolerant -fb sensation,redness  2. Dry eyes  *(Xiidra bid OU(too expensive, not helping)  3. Restor IOL OU w/YAG OS (readers for fine print)  4. DM x 2010  **? Steriod Response 17/17 on 12/19 after steroid shot      Dorzolamide/Timolol BID OU  Lumigan 0.01 QHS OU  Systane PRN OU  O3FO          Last edited by Anna Ku, Patient Care Assistant on 5/4/2020 10:20   AM. (History)         Assessment /Plan :  1. Primary open angle glaucoma of both eyes, moderate stage Doing well, IOP within acceptable range relative to target IOP and no evidence of progression. Continue current treatment. Reviewed importance of continued compliance with treatment and follow up.     2. Pseudophakia of both eyes  -- Condition stable, no therapeutic change required. Monitoring routinely.     3. Diabetes mellitus type 2 without retinopathy No diabetic retinopathy at this time. Reviewed diabetic eye precautions including avoiding tobacco use,  Good glucose control, and importance of regular follow up.      4. Trichiasis of eyelid of right eye, unspecified eyelid FORCEPS EPILATION PROCEDURE:    Pt has symptomatic trichiasis of the OD eyelid(s).  Risk, benefits  and alternatives to cilia removal by forceps was reviewed and pt requested that this be performed at this time.  AkTaine gtts introduced into both eyes and cilia forceps, under slit lamp imagnification,  were used for epilation, removing the following number of cilia:  RUL: 2  BEENA: 0  RLL: 1   LLL: 0      RTC prn or as scheduled           Return to clinic in 3 months  or as needed.  With IOP Check and GOCT

## 2020-09-21 ENCOUNTER — OFFICE VISIT (OUTPATIENT)
Dept: OPHTHALMOLOGY | Facility: CLINIC | Age: 70
End: 2020-09-21
Payer: MEDICARE

## 2020-09-21 DIAGNOSIS — Z96.1 PSEUDOPHAKIA OF BOTH EYES: ICD-10-CM

## 2020-09-21 DIAGNOSIS — H04.123 DRY EYES, BILATERAL: ICD-10-CM

## 2020-09-21 DIAGNOSIS — H40.1232 LOW-TENSION GLAUCOMA OF BOTH EYES, MODERATE STAGE: ICD-10-CM

## 2020-09-21 DIAGNOSIS — H40.1132 PRIMARY OPEN ANGLE GLAUCOMA OF BOTH EYES, MODERATE STAGE: Primary | ICD-10-CM

## 2020-09-21 PROCEDURE — 92133 POSTERIOR SEGMENT OCT OPTIC NERVE(OCULAR COHERENCE TOMOGRAPHY) - OU - BOTH EYES: ICD-10-PCS | Mod: S$GLB,,, | Performed by: OPHTHALMOLOGY

## 2020-09-21 PROCEDURE — 92012 PR EYE EXAM, EST PATIENT,INTERMED: ICD-10-PCS | Mod: S$GLB,,, | Performed by: OPHTHALMOLOGY

## 2020-09-21 PROCEDURE — 92133 CPTRZD OPH DX IMG PST SGM ON: CPT | Mod: S$GLB,,, | Performed by: OPHTHALMOLOGY

## 2020-09-21 PROCEDURE — 99999 PR PBB SHADOW E&M-EST. PATIENT-LVL III: CPT | Mod: PBBFAC,,, | Performed by: OPHTHALMOLOGY

## 2020-09-21 PROCEDURE — 99999 PR PBB SHADOW E&M-EST. PATIENT-LVL III: ICD-10-PCS | Mod: PBBFAC,,, | Performed by: OPHTHALMOLOGY

## 2020-09-21 PROCEDURE — 92012 INTRM OPH EXAM EST PATIENT: CPT | Mod: S$GLB,,, | Performed by: OPHTHALMOLOGY

## 2020-09-21 RX ORDER — MELOXICAM 15 MG/1
15 TABLET ORAL DAILY
COMMUNITY
Start: 2020-08-25

## 2020-09-21 RX ORDER — ALENDRONATE SODIUM 35 MG/1
35 TABLET ORAL
COMMUNITY
Start: 2020-09-03

## 2020-09-21 RX ORDER — TRAVOPROST OPHTHALMIC SOLUTION 0.04 MG/ML
1 SOLUTION OPHTHALMIC NIGHTLY
Qty: 2.5 ML | Refills: 12 | Status: SHIPPED | OUTPATIENT
Start: 2020-09-21 | End: 2021-05-25

## 2020-09-21 NOTE — PROGRESS NOTES
SUBJECTIVE  Gris TIAN Jin is 70 y.o. female  Uncorrected distance visual acuity was 20/25 in the right eye and 20/20 -2 in the left eye.   Chief Complaint   Patient presents with    Glaucoma     3M IOP w/ GOCT          HPI     Glaucoma      Additional comments: 3M IOP w/ GOCT              Comments     The patient states that her eyes are doing good & denies any pain. 100%   drop compliance.    Brittany Guerra PCP (needs letters)    1. Mod COAG OS>OD Goal=15-16  SLT OD 8/07 (25-16) + 2/15/17 (17.5-15.5)  SLT OS 2/15/17 (18-16)  Rhopressa OU qhs-intolerant -fb sensation,redness  ? Steriod Response 17/17 on 12/19 after steroid shot  2. Dry eyes  *(Xiidra bid OU(too expensive, not helping)  3. Restor IOL OU w/YAG OS (readers for fine print)  4. DM x 2010  5. Trichiasis OD      Dorzolamide/Timolol BID OU  Lumigan 0.01 QHS OU  Systane PRN OU  O3FO          Last edited by Estelle Cintron on 9/21/2020  2:17 PM. (History)         Assessment /Plan :  1. Primary open angle glaucoma of both eyes, moderate stage    2. Low-tension glaucoma of both eyes, moderate stage IOP not within acceptable range relative to target IOP with risk of irreversible visual loss. Better IOP control is recommended. Discussed options, risks, and benefits of additional medication, SLT laser, and/or incisional glaucoma surgery. Reviewed importance of continued compliance with treatment and follow up.     Patient chooses change glaucoma medication   Will Stop Lumigan and being Vyzulta QHS OU   Will consider Brimonidine, consider SLT      3. Pseudophakia of both eyes - Stable, Follow   4. Dry eyes, bilateral - Follow     Return to clinic in 1 month IOP check  or as needed

## 2020-10-21 ENCOUNTER — OFFICE VISIT (OUTPATIENT)
Dept: OPHTHALMOLOGY | Facility: CLINIC | Age: 70
End: 2020-10-21
Payer: MEDICARE

## 2020-10-21 DIAGNOSIS — H40.1132 PRIMARY OPEN ANGLE GLAUCOMA OF BOTH EYES, MODERATE STAGE: Primary | ICD-10-CM

## 2020-10-21 DIAGNOSIS — H40.1232 LOW-TENSION GLAUCOMA OF BOTH EYES, MODERATE STAGE: ICD-10-CM

## 2020-10-21 DIAGNOSIS — H04.123 DRY EYES, BILATERAL: ICD-10-CM

## 2020-10-21 DIAGNOSIS — E11.9 DIABETES MELLITUS TYPE 2 WITHOUT RETINOPATHY: ICD-10-CM

## 2020-10-21 PROCEDURE — 92012 INTRM OPH EXAM EST PATIENT: CPT | Mod: S$GLB,,, | Performed by: OPHTHALMOLOGY

## 2020-10-21 PROCEDURE — 99999 PR PBB SHADOW E&M-EST. PATIENT-LVL III: ICD-10-PCS | Mod: PBBFAC,,, | Performed by: OPHTHALMOLOGY

## 2020-10-21 PROCEDURE — 92012 PR EYE EXAM, EST PATIENT,INTERMED: ICD-10-PCS | Mod: S$GLB,,, | Performed by: OPHTHALMOLOGY

## 2020-10-21 PROCEDURE — 99999 PR PBB SHADOW E&M-EST. PATIENT-LVL III: CPT | Mod: PBBFAC,,, | Performed by: OPHTHALMOLOGY

## 2020-10-21 RX ORDER — LATANOPROSTENE BUNOD 0.24 MG/ML
SOLUTION/ DROPS OPHTHALMIC
COMMUNITY
End: 2023-02-20 | Stop reason: SDUPTHER

## 2020-10-21 NOTE — PROGRESS NOTES
SUBJECTIVE  Gris TIAN Jin is 70 y.o. female  Uncorrected distance visual acuity was 20/20 -2 in the right eye and 20/20 -1 in the left eye.   Chief Complaint   Patient presents with    Glaucoma     1M IOP Check          HPI     Glaucoma      Additional comments: 1M IOP Check              Comments     The patient states that her eyes are doing well but she has some moments   of intermittent cloudiness. She states that it's mostly when she lifts her   head up its momentarily cloudy. She denies any pain. 100% drops.    Brittany Guerra PCP (needs letters)    1. Mod COAG OS>OD Goal=15-16 ? OF LTG COMPT.  SLT OD 8/07 (25-16) + 2/15/17 (17.5-15.5)  SLT OS 2/15/17 (18-16)  Rhopressa OU qhs-intolerant -fb sensation,redness  ? Steriod Response 17/17 on 12/19 after steroid shot  2. Dry eyes  *(Xiidra bid OU(too expensive, not helping)  3. Restor IOL OU w/YAG OS (readers for fine print)  4. DM x 2010  5. Trichiasis OD      Dorzolamide/Timolol BID OU  Vyzulta QHS OU  Systane PRN OU  O3FO          Last edited by Estelle Cintron on 10/21/2020  3:17 PM. (History)         Assessment /Plan :  1. Primary open angle glaucoma of both eyes, moderate stage Doing well, IOP within acceptable range relative to target IOP and no evidence of progression. Continue current treatment. Reviewed importance of continued compliance with treatment and follow up.   good response to vyzulta csm    Return to clinic in 3 months  or as needed.  With IOP Check and GOCT     2. Low-tension glaucoma of both eyes, moderate stage    3. Dry eyes, bilateral    4. Diabetes mellitus type 2 without retinopathy

## 2021-05-25 ENCOUNTER — OFFICE VISIT (OUTPATIENT)
Dept: OPHTHALMOLOGY | Facility: CLINIC | Age: 71
End: 2021-05-25
Payer: MEDICARE

## 2021-05-25 DIAGNOSIS — Z96.1 PSEUDOPHAKIA OF BOTH EYES: ICD-10-CM

## 2021-05-25 DIAGNOSIS — H40.1132 PRIMARY OPEN ANGLE GLAUCOMA OF BOTH EYES, MODERATE STAGE: Primary | ICD-10-CM

## 2021-05-25 DIAGNOSIS — H04.123 DRY EYES, BILATERAL: ICD-10-CM

## 2021-05-25 PROCEDURE — 99999 PR PBB SHADOW E&M-EST. PATIENT-LVL III: CPT | Mod: PBBFAC,,, | Performed by: OPHTHALMOLOGY

## 2021-05-25 PROCEDURE — 99214 OFFICE O/P EST MOD 30 MIN: CPT | Mod: S$GLB,,, | Performed by: OPHTHALMOLOGY

## 2021-05-25 PROCEDURE — 92133 CPTRZD OPH DX IMG PST SGM ON: CPT | Mod: S$GLB,,, | Performed by: OPHTHALMOLOGY

## 2021-05-25 PROCEDURE — 99999 PR PBB SHADOW E&M-EST. PATIENT-LVL III: ICD-10-PCS | Mod: PBBFAC,,, | Performed by: OPHTHALMOLOGY

## 2021-05-25 PROCEDURE — 1159F PR MEDICATION LIST DOCUMENTED IN MEDICAL RECORD: ICD-10-PCS | Mod: S$GLB,,, | Performed by: OPHTHALMOLOGY

## 2021-05-25 PROCEDURE — 92133 POSTERIOR SEGMENT OCT OPTIC NERVE(OCULAR COHERENCE TOMOGRAPHY) - OU - BOTH EYES: ICD-10-PCS | Mod: S$GLB,,, | Performed by: OPHTHALMOLOGY

## 2021-05-25 PROCEDURE — 1159F MED LIST DOCD IN RCRD: CPT | Mod: S$GLB,,, | Performed by: OPHTHALMOLOGY

## 2021-05-25 PROCEDURE — 99214 PR OFFICE/OUTPT VISIT, EST, LEVL IV, 30-39 MIN: ICD-10-PCS | Mod: S$GLB,,, | Performed by: OPHTHALMOLOGY

## 2021-09-27 ENCOUNTER — OFFICE VISIT (OUTPATIENT)
Dept: OPHTHALMOLOGY | Facility: CLINIC | Age: 71
End: 2021-09-27
Payer: MEDICARE

## 2021-09-27 DIAGNOSIS — H40.1132 PRIMARY OPEN ANGLE GLAUCOMA (POAG) OF BOTH EYES, MODERATE STAGE: Primary | ICD-10-CM

## 2021-09-27 DIAGNOSIS — H04.123 DRY EYES, BILATERAL: ICD-10-CM

## 2021-09-27 DIAGNOSIS — E11.9 DIABETES MELLITUS TYPE 2 WITHOUT RETINOPATHY: ICD-10-CM

## 2021-09-27 DIAGNOSIS — Z96.1 PSEUDOPHAKIA OF BOTH EYES: ICD-10-CM

## 2021-09-27 PROCEDURE — 1160F PR REVIEW ALL MEDS BY PRESCRIBER/CLIN PHARMACIST DOCUMENTED: ICD-10-PCS | Mod: S$GLB,,, | Performed by: OPHTHALMOLOGY

## 2021-09-27 PROCEDURE — 2023F DILAT RTA XM W/O RTNOPTHY: CPT | Mod: S$GLB,,, | Performed by: OPHTHALMOLOGY

## 2021-09-27 PROCEDURE — 1160F RVW MEDS BY RX/DR IN RCRD: CPT | Mod: S$GLB,,, | Performed by: OPHTHALMOLOGY

## 2021-09-27 PROCEDURE — 1159F MED LIST DOCD IN RCRD: CPT | Mod: S$GLB,,, | Performed by: OPHTHALMOLOGY

## 2021-09-27 PROCEDURE — 92014 PR EYE EXAM, EST PATIENT,COMPREHESV: ICD-10-PCS | Mod: S$GLB,,, | Performed by: OPHTHALMOLOGY

## 2021-09-27 PROCEDURE — 92083 EXTENDED VISUAL FIELD XM: CPT | Mod: S$GLB,,, | Performed by: OPHTHALMOLOGY

## 2021-09-27 PROCEDURE — 99999 PR PBB SHADOW E&M-EST. PATIENT-LVL III: ICD-10-PCS | Mod: PBBFAC,,, | Performed by: OPHTHALMOLOGY

## 2021-09-27 PROCEDURE — 2023F PR DILATED RETINAL EXAM W/O EVID OF RETINOPATHY: ICD-10-PCS | Mod: S$GLB,,, | Performed by: OPHTHALMOLOGY

## 2021-09-27 PROCEDURE — 99999 PR PBB SHADOW E&M-EST. PATIENT-LVL III: CPT | Mod: PBBFAC,,, | Performed by: OPHTHALMOLOGY

## 2021-09-27 PROCEDURE — 1159F PR MEDICATION LIST DOCUMENTED IN MEDICAL RECORD: ICD-10-PCS | Mod: S$GLB,,, | Performed by: OPHTHALMOLOGY

## 2021-09-27 PROCEDURE — 92133 CPTRZD OPH DX IMG PST SGM ON: CPT | Mod: S$GLB,,, | Performed by: OPHTHALMOLOGY

## 2021-09-27 PROCEDURE — 92014 COMPRE OPH EXAM EST PT 1/>: CPT | Mod: S$GLB,,, | Performed by: OPHTHALMOLOGY

## 2021-09-27 PROCEDURE — 92133 OCT, OPTIC NERVE - OU - BOTH EYES: ICD-10-PCS | Mod: S$GLB,,, | Performed by: OPHTHALMOLOGY

## 2021-09-27 PROCEDURE — 92083 HUMPHREY VISUAL FIELD - OU - BOTH EYES: ICD-10-PCS | Mod: S$GLB,,, | Performed by: OPHTHALMOLOGY

## 2021-09-27 RX ORDER — KETOROLAC TROMETHAMINE 5 MG/ML
SOLUTION OPHTHALMIC
Qty: 1 BOTTLE | Refills: 0 | Status: SHIPPED | OUTPATIENT
Start: 2021-09-27

## 2021-11-22 ENCOUNTER — TELEPHONE (OUTPATIENT)
Dept: OPHTHALMOLOGY | Facility: CLINIC | Age: 71
End: 2021-11-22
Payer: MEDICARE

## 2021-12-06 ENCOUNTER — OFFICE VISIT (OUTPATIENT)
Dept: OPHTHALMOLOGY | Facility: CLINIC | Age: 71
End: 2021-12-06
Payer: MEDICARE

## 2021-12-06 DIAGNOSIS — H40.1132 PRIMARY OPEN ANGLE GLAUCOMA (POAG) OF BOTH EYES, MODERATE STAGE: Primary | ICD-10-CM

## 2021-12-06 DIAGNOSIS — Z96.1 PSEUDOPHAKIA OF BOTH EYES: ICD-10-CM

## 2021-12-06 DIAGNOSIS — M35.01 KERATITIS SICCA, BILATERAL: ICD-10-CM

## 2021-12-06 PROCEDURE — 99214 OFFICE O/P EST MOD 30 MIN: CPT | Mod: S$GLB,,, | Performed by: OPHTHALMOLOGY

## 2021-12-06 PROCEDURE — 99214 PR OFFICE/OUTPT VISIT, EST, LEVL IV, 30-39 MIN: ICD-10-PCS | Mod: S$GLB,,, | Performed by: OPHTHALMOLOGY

## 2021-12-06 PROCEDURE — 99999 PR PBB SHADOW E&M-EST. PATIENT-LVL III: CPT | Mod: PBBFAC,,, | Performed by: OPHTHALMOLOGY

## 2021-12-06 PROCEDURE — 99999 PR PBB SHADOW E&M-EST. PATIENT-LVL III: ICD-10-PCS | Mod: PBBFAC,,, | Performed by: OPHTHALMOLOGY

## 2021-12-06 RX ORDER — CYCLOSPORINE 0.5 MG/ML
1 EMULSION OPHTHALMIC 2 TIMES DAILY
Qty: 60 EACH | Refills: 12 | Status: SHIPPED | OUTPATIENT
Start: 2021-12-06

## 2022-04-04 ENCOUNTER — OFFICE VISIT (OUTPATIENT)
Dept: OPHTHALMOLOGY | Facility: CLINIC | Age: 72
End: 2022-04-04
Payer: MEDICARE

## 2022-04-04 DIAGNOSIS — H40.1132 PRIMARY OPEN ANGLE GLAUCOMA (POAG) OF BOTH EYES, MODERATE STAGE: Primary | ICD-10-CM

## 2022-04-04 DIAGNOSIS — M35.01 KERATITIS SICCA, BILATERAL: ICD-10-CM

## 2022-04-04 DIAGNOSIS — Z96.1 PSEUDOPHAKIA OF BOTH EYES: ICD-10-CM

## 2022-04-04 PROCEDURE — 99999 PR PBB SHADOW E&M-EST. PATIENT-LVL III: ICD-10-PCS | Mod: PBBFAC,,, | Performed by: OPHTHALMOLOGY

## 2022-04-04 PROCEDURE — 1159F MED LIST DOCD IN RCRD: CPT | Mod: CPTII,S$GLB,, | Performed by: OPHTHALMOLOGY

## 2022-04-04 PROCEDURE — 1160F PR REVIEW ALL MEDS BY PRESCRIBER/CLIN PHARMACIST DOCUMENTED: ICD-10-PCS | Mod: CPTII,S$GLB,, | Performed by: OPHTHALMOLOGY

## 2022-04-04 PROCEDURE — 92133 CPTRZD OPH DX IMG PST SGM ON: CPT | Mod: S$GLB,,, | Performed by: OPHTHALMOLOGY

## 2022-04-04 PROCEDURE — 1159F PR MEDICATION LIST DOCUMENTED IN MEDICAL RECORD: ICD-10-PCS | Mod: CPTII,S$GLB,, | Performed by: OPHTHALMOLOGY

## 2022-04-04 PROCEDURE — 1160F RVW MEDS BY RX/DR IN RCRD: CPT | Mod: CPTII,S$GLB,, | Performed by: OPHTHALMOLOGY

## 2022-04-04 PROCEDURE — 92133 POSTERIOR SEGMENT OCT OPTIC NERVE(OCULAR COHERENCE TOMOGRAPHY) - OU - BOTH EYES: ICD-10-PCS | Mod: S$GLB,,, | Performed by: OPHTHALMOLOGY

## 2022-04-04 PROCEDURE — 99999 PR PBB SHADOW E&M-EST. PATIENT-LVL III: CPT | Mod: PBBFAC,,, | Performed by: OPHTHALMOLOGY

## 2022-04-04 PROCEDURE — 99214 OFFICE O/P EST MOD 30 MIN: CPT | Mod: S$GLB,,, | Performed by: OPHTHALMOLOGY

## 2022-04-04 PROCEDURE — 99214 PR OFFICE/OUTPT VISIT, EST, LEVL IV, 30-39 MIN: ICD-10-PCS | Mod: S$GLB,,, | Performed by: OPHTHALMOLOGY

## 2022-04-04 NOTE — PROGRESS NOTES
SUBJECTIVE  Gris TIAN Jin is 71 y.o. female  Uncorrected distance visual acuity was 20/30 +1 in the right eye and 20/20 -2 in the left eye.   Chief Complaint   Patient presents with    Glaucoma     Pt reports for 3m COAG IOP check with GOCT. Denies any pain or irritation. Va stable. 100% compliant with gtts. Dry eyes OU.           HPI     Glaucoma      Additional comments: Pt reports for 3m COAG IOP check with GOCT. Denies   any pain or irritation. Va stable. 100% compliant with gtts. Dry eyes OU.                 Comments       1. Mod COAG OS>OD Goal=15-16 ? OF LTG COMPT.   SLT OD 8/07 (25-16) + 2/15/17 (17.5-15.5) + 10/20/21 (16-13)  SLT OS 2/15/17 (18-16) + 10/20/21 (20-17)  Rhopressa OU qhs-intolerant -fb sensation,redness   ? Steriod Response 17/17 on 12/19 after steroid shot   2. Dry eyes   (Xiidra - too expensive)   3. Restor IOL OU w/YAG OS (readers for fine print)   4. DM x 2010   5. Trichiasis OD       Dorzolamide/Timolol BID OU   Vyzulta QHS OU   Systane PRN OU   O3FO            Last edited by Ellis Amaro on 4/4/2022  3:41 PM. (History)         Assessment /Plan :  1. Primary open angle glaucoma (POAG) of both eyes, moderate stage Doing well, IOP within acceptable range relative to target IOP and no evidence of progression. Continue current treatment. Reviewed importance of continued compliance with treatment and follow up.      Patient instructed to continue using the following glaucoma medication as follows:  Vyzulta one drop in each eye nightly and Dorzolamide/Timolol (Cosopt) one drop in each eye every 12 hours    Return to clinic in 3 months  or as needed.  With IOP Check       2. Pseudophakia of both eyes  -- Condition stable, no therapeutic change required. Monitoring routinely.     3. Keratitis sicca, bilateral  -- Condition stable, no therapeutic change required. Monitoring routinely.

## 2022-07-11 ENCOUNTER — OFFICE VISIT (OUTPATIENT)
Dept: OPHTHALMOLOGY | Facility: CLINIC | Age: 72
End: 2022-07-11
Payer: MEDICARE

## 2022-07-11 DIAGNOSIS — M35.01 KERATITIS SICCA, BILATERAL: ICD-10-CM

## 2022-07-11 DIAGNOSIS — Z96.1 PSEUDOPHAKIA OF BOTH EYES: ICD-10-CM

## 2022-07-11 DIAGNOSIS — H40.1132 PRIMARY OPEN ANGLE GLAUCOMA (POAG) OF BOTH EYES, MODERATE STAGE: Primary | ICD-10-CM

## 2022-07-11 PROCEDURE — 1160F PR REVIEW ALL MEDS BY PRESCRIBER/CLIN PHARMACIST DOCUMENTED: ICD-10-PCS | Mod: CPTII,S$GLB,, | Performed by: OPHTHALMOLOGY

## 2022-07-11 PROCEDURE — 99214 PR OFFICE/OUTPT VISIT, EST, LEVL IV, 30-39 MIN: ICD-10-PCS | Mod: S$GLB,,, | Performed by: OPHTHALMOLOGY

## 2022-07-11 PROCEDURE — 99999 PR PBB SHADOW E&M-EST. PATIENT-LVL III: ICD-10-PCS | Mod: PBBFAC,,, | Performed by: OPHTHALMOLOGY

## 2022-07-11 PROCEDURE — 99214 OFFICE O/P EST MOD 30 MIN: CPT | Mod: S$GLB,,, | Performed by: OPHTHALMOLOGY

## 2022-07-11 PROCEDURE — 1159F MED LIST DOCD IN RCRD: CPT | Mod: CPTII,S$GLB,, | Performed by: OPHTHALMOLOGY

## 2022-07-11 PROCEDURE — 99999 PR PBB SHADOW E&M-EST. PATIENT-LVL III: CPT | Mod: PBBFAC,,, | Performed by: OPHTHALMOLOGY

## 2022-07-11 PROCEDURE — 1159F PR MEDICATION LIST DOCUMENTED IN MEDICAL RECORD: ICD-10-PCS | Mod: CPTII,S$GLB,, | Performed by: OPHTHALMOLOGY

## 2022-07-11 PROCEDURE — 1160F RVW MEDS BY RX/DR IN RCRD: CPT | Mod: CPTII,S$GLB,, | Performed by: OPHTHALMOLOGY

## 2022-07-11 NOTE — PROGRESS NOTES
SUBJECTIVE  Gris TIAN Jin is 71 y.o. female  Uncorrected distance visual acuity was 20/25 -2 in the right eye and 20/25 -1 in the left eye.   Chief Complaint   Patient presents with    Glaucoma     Pt reports for 3m IOP check. Denies any pain or irritation. Eyes dry OU. Va blurry. 100% compliant with gtts.           HPI     Glaucoma      Additional comments: Pt reports for 3m IOP check. Denies any pain or   irritation. Eyes dry OU. Va blurry. 100% compliant with gtts.               Comments     1. Mod COAG OS>OD Goal=15-16 ? OF LTG COMPT.   SLT OD 8/07 (25-16) + 2/15/17 (17.5-15.5) + 10/20/21 (16-13)  SLT OS 2/15/17 (18-16) + 10/20/21 (20-17)  Rhopressa OU qhs-intolerant -fb sensation,redness   ? Steriod Response 17/17 on 12/19 after steroid shot   2. Dry eyes   (Xiidra - too expensive)   3. Restor IOL OU w/YAG OS (readers for fine print)   4. DM x 2010   5. Trichiasis OD       Dorzolamide/Timolol BID OU   Vyzulta QHS OU   Systane PRN OU   O3FO            Last edited by Ellis Amaro on 7/11/2022  3:50 PM. (History)         Assessment /Plan :  1. Primary open angle glaucoma (POAG) of both eyes, moderate stage Doing well, intraocular pressure (IOP) within acceptable range relative to target IOP and no evidence of progression. Continue current treatment. Reviewed importance of continued compliance with treatment and follow up.      Patient instructed to continue using the following glaucoma medication as follows:  Vyzulta one drop in each eye nightly and Dorzolamide/Timolol (Cosopt) one drop in each eye every 12 hours    Return to clinic in 3-4 months  or as needed.  With GOCT, Dilation and HVF 24-2       2. Pseudophakia of both eyes  -- Condition stable, no therapeutic change required. Monitoring routinely.     3. Keratitis sicca, bilateral  -- Condition stable, no therapeutic change required. Monitoring routinely.

## 2023-02-20 DIAGNOSIS — H40.1132 PRIMARY OPEN ANGLE GLAUCOMA OF BOTH EYES, MODERATE STAGE: ICD-10-CM

## 2023-02-20 RX ORDER — LATANOPROSTENE BUNOD 0.24 MG/ML
1 SOLUTION/ DROPS OPHTHALMIC DAILY
Qty: 2.5 ML | Refills: 3 | Status: SHIPPED | OUTPATIENT
Start: 2023-02-20 | End: 2023-02-22 | Stop reason: SDUPTHER

## 2023-02-20 RX ORDER — DORZOLAMIDE HYDROCHLORIDE AND TIMOLOL MALEATE 20; 5 MG/ML; MG/ML
1 SOLUTION/ DROPS OPHTHALMIC 2 TIMES DAILY
Qty: 10 ML | Refills: 3 | Status: SHIPPED | OUTPATIENT
Start: 2023-02-20 | End: 2023-08-17

## 2023-02-22 DIAGNOSIS — H40.1132 PRIMARY OPEN ANGLE GLAUCOMA (POAG) OF BOTH EYES, MODERATE STAGE: Primary | ICD-10-CM

## 2023-02-22 RX ORDER — LATANOPROSTENE BUNOD 0.24 MG/ML
1 SOLUTION/ DROPS OPHTHALMIC DAILY
Qty: 2.5 ML | Refills: 3 | Status: CANCELLED | OUTPATIENT
Start: 2023-02-22

## 2023-02-22 RX ORDER — LATANOPROST 50 UG/ML
1 SOLUTION/ DROPS OPHTHALMIC DAILY
Qty: 2.5 ML | Refills: 6 | Status: SHIPPED | OUTPATIENT
Start: 2023-02-22 | End: 2023-12-08 | Stop reason: SDUPTHER

## 2023-06-05 ENCOUNTER — OFFICE VISIT (OUTPATIENT)
Dept: OPHTHALMOLOGY | Facility: CLINIC | Age: 73
End: 2023-06-05
Payer: MEDICARE

## 2023-06-05 DIAGNOSIS — E11.9 DIABETES MELLITUS TYPE 2 WITHOUT RETINOPATHY: ICD-10-CM

## 2023-06-05 DIAGNOSIS — M35.01 KERATITIS SICCA, BILATERAL: ICD-10-CM

## 2023-06-05 DIAGNOSIS — Z96.1 PSEUDOPHAKIA OF BOTH EYES: ICD-10-CM

## 2023-06-05 DIAGNOSIS — H40.1132 PRIMARY OPEN ANGLE GLAUCOMA (POAG) OF BOTH EYES, MODERATE STAGE: Primary | ICD-10-CM

## 2023-06-05 PROCEDURE — 2023F DILAT RTA XM W/O RTNOPTHY: CPT | Mod: CPTII,S$GLB,, | Performed by: OPHTHALMOLOGY

## 2023-06-05 PROCEDURE — 2023F PR DILATED RETINAL EXAM W/O EVID OF RETINOPATHY: ICD-10-PCS | Mod: CPTII,S$GLB,, | Performed by: OPHTHALMOLOGY

## 2023-06-05 PROCEDURE — 1159F PR MEDICATION LIST DOCUMENTED IN MEDICAL RECORD: ICD-10-PCS | Mod: CPTII,S$GLB,, | Performed by: OPHTHALMOLOGY

## 2023-06-05 PROCEDURE — 1160F RVW MEDS BY RX/DR IN RCRD: CPT | Mod: CPTII,S$GLB,, | Performed by: OPHTHALMOLOGY

## 2023-06-05 PROCEDURE — 1159F MED LIST DOCD IN RCRD: CPT | Mod: CPTII,S$GLB,, | Performed by: OPHTHALMOLOGY

## 2023-06-05 PROCEDURE — 99999 PR PBB SHADOW E&M-EST. PATIENT-LVL III: CPT | Mod: PBBFAC,,, | Performed by: OPHTHALMOLOGY

## 2023-06-05 PROCEDURE — 92014 PR EYE EXAM, EST PATIENT,COMPREHESV: ICD-10-PCS | Mod: S$GLB,,, | Performed by: OPHTHALMOLOGY

## 2023-06-05 PROCEDURE — 99999 PR PBB SHADOW E&M-EST. PATIENT-LVL III: ICD-10-PCS | Mod: PBBFAC,,, | Performed by: OPHTHALMOLOGY

## 2023-06-05 PROCEDURE — 92083 HUMPHREY VISUAL FIELD - OU - BOTH EYES: ICD-10-PCS | Mod: S$GLB,,, | Performed by: OPHTHALMOLOGY

## 2023-06-05 PROCEDURE — 92014 COMPRE OPH EXAM EST PT 1/>: CPT | Mod: S$GLB,,, | Performed by: OPHTHALMOLOGY

## 2023-06-05 PROCEDURE — 92133 CPTRZD OPH DX IMG PST SGM ON: CPT | Mod: S$GLB,,, | Performed by: OPHTHALMOLOGY

## 2023-06-05 PROCEDURE — 1160F PR REVIEW ALL MEDS BY PRESCRIBER/CLIN PHARMACIST DOCUMENTED: ICD-10-PCS | Mod: CPTII,S$GLB,, | Performed by: OPHTHALMOLOGY

## 2023-06-05 PROCEDURE — 92083 EXTENDED VISUAL FIELD XM: CPT | Mod: S$GLB,,, | Performed by: OPHTHALMOLOGY

## 2023-06-05 PROCEDURE — 92133 POSTERIOR SEGMENT OCT OPTIC NERVE(OCULAR COHERENCE TOMOGRAPHY) - OU - BOTH EYES: ICD-10-PCS | Mod: S$GLB,,, | Performed by: OPHTHALMOLOGY

## 2023-06-05 NOTE — PROGRESS NOTES
SUBJECTIVE  Gris Jin is 72 y.o. female  Uncorrected distance visual acuity was 20/25 -2 in the right eye and 20/25 in the left eye.   Chief Complaint   Patient presents with    Glaucoma     Pt reports for gOCT, HVF and dilation, no pain or irritations. Pt states compliance with Dorzolamide/Timolol BID OU   Vyzulta QHS OU   Systane PRN OU   O3FO            HPI     Glaucoma     Additional comments: Pt reports for gOCT, HVF and dilation, no pain or   irritations. Pt states compliance with Dorzolamide/Timolol BID OU   Vyzulta QHS OU   Systane PRN OU   O3FO             Comments    Brittany Guerra PCP (needs letters)     1. Mod COAG OS>OD Goal=15-16 ? OF LTG COMPT.   SLT OD 8/07 (25-16) + 2/15/17 (17.5-15.5) + 10/20/21 (16-13)  SLT OS 2/15/17 (18-16) + 10/20/21 (20-17)  Rhopressa OU qhs-intolerant -fb sensation,redness   ? Steriod Response 17/17 on 12/19 after steroid shot   2. Dry eyes   (Xiidra - too expensive)   3. Restor IOL OU w/YAG OS (readers for fine print)   4. DM x 2010   5. Trichiasis OD       Dorzolamide/Timolol BID OU   Vyzulta QHS OU   Systane PRN OU   O3FO            Last edited by Esther Veloz MA on 6/5/2023  2:10 PM.         Assessment /Plan :  1. Primary open angle glaucoma (POAG) of both eyes, moderate stage Doing well, intraocular pressure (IOP) within acceptable range relative to target IOP and no evidence of progression. Continue current treatment. Reviewed importance of continued compliance with treatment and follow up.      Patient instructed to continue using the following glaucoma medication as follows:  Vyzulta one drop in each eye nightly and Dorzolamide/Timolol (Cosopt) one drop in each eye every 12 hours    Return to clinic in 3-4 months  or as needed.  With IOP Check     2. Diabetes mellitus type 2 without retinopathy No diabetic retinopathy at this time. Reviewed diabetic eye precautions including avoiding tobacco use,  Good glucose control, and  importance of regular follow up.      3. Pseudophakia of both eyes  -- Condition stable, no therapeutic change required. Monitoring routinely.     4. Keratitis sicca, bilateral  -- Condition stable, no therapeutic change required. Monitoring routinely.

## 2023-06-05 NOTE — LETTER
[facility]  [address]  [place] [address]  Phone: [phone]  Fax: [phone]   June 5, 2023    Kodak Guerra MD  [address]  [address]  [place] [address]    Patient: Gris Jin   MR Number: [id number]   YOB: 1950   Date of Visit: 6/5/2023       Dear Dr. Guerra :    Thank you for referring Gris Jin to me for evaluation. Here is my assessment and plan of care:    Assessment/Plan :  1. Primary open angle glaucoma (POAG) of both eyes, moderate stage Doing well, intraocular pressure (IOP) within acceptable range relative to target IOP and no evidence of progression. Continue current treatment. Reviewed importance of continued compliance with treatment and follow up.      Patient instructed to continue using the following glaucoma medication as follows:  Vyzulta one drop in each eye nightly and Dorzolamide/Timolol (Cosopt) one drop in each eye every 12 hours    Return to clinic in 3-4 months  or as needed.  With IOP Check     2. Diabetes mellitus type 2 without retinopathy No diabetic retinopathy at this time. Reviewed diabetic eye precautions including avoiding tobacco use,  Good glucose control, and importance of regular follow up.      3. Pseudophakia of both eyes  -- Condition stable, no therapeutic change required. Monitoring routinely.     4. Keratitis sicca, bilateral  -- Condition stable, no therapeutic change required. Monitoring routinely.             If you have questions, please do not hesitate to call me. I look forward to following Ms. Gris Jin along with you.    Sincerely,        Harrison Treadwell MD       CC  No Recipients

## 2023-06-14 ENCOUNTER — OFFICE VISIT (OUTPATIENT)
Dept: OBSTETRICS AND GYNECOLOGY | Facility: CLINIC | Age: 73
End: 2023-06-14
Payer: MEDICARE

## 2023-06-14 VITALS
SYSTOLIC BLOOD PRESSURE: 122 MMHG | WEIGHT: 217.13 LBS | DIASTOLIC BLOOD PRESSURE: 70 MMHG | HEIGHT: 67 IN | BODY MASS INDEX: 34.08 KG/M2

## 2023-06-14 DIAGNOSIS — N81.11 CYSTOCELE, MIDLINE: ICD-10-CM

## 2023-06-14 DIAGNOSIS — N76.2 ACUTE VULVITIS: ICD-10-CM

## 2023-06-14 DIAGNOSIS — Z01.419 ENCOUNTER FOR GYNECOLOGICAL EXAMINATION WITHOUT ABNORMAL FINDING: Primary | ICD-10-CM

## 2023-06-14 DIAGNOSIS — B37.31 YEAST VAGINITIS: ICD-10-CM

## 2023-06-14 PROCEDURE — 1160F RVW MEDS BY RX/DR IN RCRD: CPT | Mod: CPTII,S$GLB,, | Performed by: NURSE PRACTITIONER

## 2023-06-14 PROCEDURE — G0101 CA SCREEN;PELVIC/BREAST EXAM: HCPCS | Mod: S$GLB,,, | Performed by: NURSE PRACTITIONER

## 2023-06-14 PROCEDURE — 1159F PR MEDICATION LIST DOCUMENTED IN MEDICAL RECORD: ICD-10-PCS | Mod: CPTII,S$GLB,, | Performed by: NURSE PRACTITIONER

## 2023-06-14 PROCEDURE — 3078F DIAST BP <80 MM HG: CPT | Mod: CPTII,S$GLB,, | Performed by: NURSE PRACTITIONER

## 2023-06-14 PROCEDURE — 3008F BODY MASS INDEX DOCD: CPT | Mod: CPTII,S$GLB,, | Performed by: NURSE PRACTITIONER

## 2023-06-14 PROCEDURE — 81514 NFCT DS BV&VAGINITIS DNA ALG: CPT | Performed by: NURSE PRACTITIONER

## 2023-06-14 PROCEDURE — 1101F PT FALLS ASSESS-DOCD LE1/YR: CPT | Mod: CPTII,S$GLB,, | Performed by: NURSE PRACTITIONER

## 2023-06-14 PROCEDURE — 1101F PR PT FALLS ASSESS DOC 0-1 FALLS W/OUT INJ PAST YR: ICD-10-PCS | Mod: CPTII,S$GLB,, | Performed by: NURSE PRACTITIONER

## 2023-06-14 PROCEDURE — 3288F PR FALLS RISK ASSESSMENT DOCUMENTED: ICD-10-PCS | Mod: CPTII,S$GLB,, | Performed by: NURSE PRACTITIONER

## 2023-06-14 PROCEDURE — 1160F PR REVIEW ALL MEDS BY PRESCRIBER/CLIN PHARMACIST DOCUMENTED: ICD-10-PCS | Mod: CPTII,S$GLB,, | Performed by: NURSE PRACTITIONER

## 2023-06-14 PROCEDURE — 1159F MED LIST DOCD IN RCRD: CPT | Mod: CPTII,S$GLB,, | Performed by: NURSE PRACTITIONER

## 2023-06-14 PROCEDURE — 3078F PR MOST RECENT DIASTOLIC BLOOD PRESSURE < 80 MM HG: ICD-10-PCS | Mod: CPTII,S$GLB,, | Performed by: NURSE PRACTITIONER

## 2023-06-14 PROCEDURE — 3288F FALL RISK ASSESSMENT DOCD: CPT | Mod: CPTII,S$GLB,, | Performed by: NURSE PRACTITIONER

## 2023-06-14 PROCEDURE — 99999 PR PBB SHADOW E&M-EST. PATIENT-LVL IV: CPT | Mod: PBBFAC,,, | Performed by: NURSE PRACTITIONER

## 2023-06-14 PROCEDURE — 3074F SYST BP LT 130 MM HG: CPT | Mod: CPTII,S$GLB,, | Performed by: NURSE PRACTITIONER

## 2023-06-14 PROCEDURE — 3074F PR MOST RECENT SYSTOLIC BLOOD PRESSURE < 130 MM HG: ICD-10-PCS | Mod: CPTII,S$GLB,, | Performed by: NURSE PRACTITIONER

## 2023-06-14 PROCEDURE — 3008F PR BODY MASS INDEX (BMI) DOCUMENTED: ICD-10-PCS | Mod: CPTII,S$GLB,, | Performed by: NURSE PRACTITIONER

## 2023-06-14 PROCEDURE — 99999 PR PBB SHADOW E&M-EST. PATIENT-LVL IV: ICD-10-PCS | Mod: PBBFAC,,, | Performed by: NURSE PRACTITIONER

## 2023-06-14 PROCEDURE — G0101 PR CA SCREEN;PELVIC/BREAST EXAM: ICD-10-PCS | Mod: S$GLB,,, | Performed by: NURSE PRACTITIONER

## 2023-06-14 RX ORDER — TRIAMCINOLONE ACETONIDE 0.25 MG/G
OINTMENT TOPICAL 2 TIMES DAILY
Qty: 15 G | Refills: 1 | Status: SHIPPED | OUTPATIENT
Start: 2023-06-14 | End: 2023-06-24

## 2023-06-14 RX ORDER — FLUCONAZOLE 150 MG/1
TABLET ORAL
Qty: 2 TABLET | Refills: 1 | Status: SHIPPED | OUTPATIENT
Start: 2023-06-14

## 2023-06-14 NOTE — PROGRESS NOTES
"  CC: Well woman exam    Gris Jin is a 72 y.o. female  presents for well woman exam.  LMP: No LMP recorded (lmp unknown). Patient is postmenopausal..    Having some vaginal itching issues  Left breast has been itching - her primary DR. Guerra has ordered a diagnostic mammogram  Mmg in past normal per pt   Pt is diabetic and A1C is over 6     Pt is an RN - running a sitter service       Past Medical History:   Diagnosis Date    Diabetes mellitus     Open angle glaucoma cupping of optic discs Goal 15-16     History reviewed. No pertinent surgical history.  Social History     Socioeconomic History    Marital status:    Tobacco Use    Smoking status: Never    Smokeless tobacco: Never   Substance and Sexual Activity    Alcohol use: Yes     Comment: occ    Drug use: No    Sexual activity: Not Currently     Partners: Male     Family History   Problem Relation Age of Onset    Diabetes Maternal Grandmother     Diabetes Paternal Grandmother      OB History          2    Para   2    Term   2            AB        Living   2         SAB        IAB        Ectopic        Multiple        Live Births   2                 /70   Ht 5' 7" (1.702 m)   Wt 98.5 kg (217 lb 2.5 oz)   LMP  (LMP Unknown)   BMI 34.01 kg/m²       ROS:  Per hpi    PHYSICAL EXAM:  APPEARANCE: Well nourished, well developed, in no acute distress.  AFFECT: WNL, alert and oriented x 3  SKIN: No acne or hirsutism  NECK: Neck symmetric without masses or thyromegaly  NODES: No inguinal, cervical, axillary, or femoral lymph node enlargement  CHEST: Good respiratory effect  ABDOMEN: Soft.  No tenderness or masses.  No hepatosplenomegaly.  No hernias.  BREASTS: Symmetrical, no skin changes or visible lesions.  No palpable masses, nipple discharge bilaterally.  PELVIC: Normal external genitalia without lesions - excoriated to right outer labia minora .  Normal hair distribution.  Adequate perineal body, normal " urethral meatus.  Vagina moist and well rugated without lesions heavy thick yellowish green discharge.  Cervix pink, without lesions, discharge or tenderness.  Mild to grade 1  cystocele no rectocele.  Bimanual exam shows uterus to be normal size, regular, mobile and nontender.  Adnexa without masses or tenderness.    EXTREMITIES: No edema.  Physical Exam    1. Encounter for gynecological examination without abnormal finding        2. Acute vulvitis  Vaginosis Screen by DNA Probe    fluconazole (DIFLUCAN) 150 MG Tab    triamcinolone acetonide 0.025% (KENALOG) 0.025 % Oint      3. Yeast vaginitis  fluconazole (DIFLUCAN) 150 MG Tab    triamcinolone acetonide 0.025% (KENALOG) 0.025 % Oint      4. Cystocele, midline         AND PLAN:    Gris OVERTON was seen today for well woman and vaginal itching.    Diagnoses and all orders for this visit:    Encounter for gynecological examination without abnormal finding    Acute vulvitis  -     Vaginosis Screen by DNA Probe  -     fluconazole (DIFLUCAN) 150 MG Tab; Take one tablet and repeat dose in 3 days  -     triamcinolone acetonide 0.025% (KENALOG) 0.025 % Oint; Apply topically 2 (two) times daily. for 10 days    Yeast vaginitis  -     fluconazole (DIFLUCAN) 150 MG Tab; Take one tablet and repeat dose in 3 days  -     triamcinolone acetonide 0.025% (KENALOG) 0.025 % Oint; Apply topically 2 (two) times daily. for 10 days    Cystocele, midline     Pt to start diflucan and triamcinolone ointment  Needs to improve her blood sugar and A1C to help with itching  Can try otc monistat cream once to twice a week to help with yeast until A1C improved   Patient was counseled today on A.C.S. Pap guidelines and recommendations for yearly pelvic exams, mammograms and monthly self breast exams; to see her PCP for other health maintenance.

## 2023-06-16 LAB
BACTERIAL VAGINOSIS DNA: NEGATIVE
CANDIDA GLABRATA DNA: NEGATIVE
CANDIDA KRUSEI DNA: NEGATIVE
CANDIDA RRNA VAG QL PROBE: POSITIVE
T VAGINALIS RRNA GENITAL QL PROBE: NEGATIVE

## 2023-08-17 DIAGNOSIS — H40.1132 PRIMARY OPEN ANGLE GLAUCOMA OF BOTH EYES, MODERATE STAGE: ICD-10-CM

## 2023-08-17 RX ORDER — DORZOLAMIDE HYDROCHLORIDE AND TIMOLOL MALEATE 20; 5 MG/ML; MG/ML
1 SOLUTION/ DROPS OPHTHALMIC 2 TIMES DAILY
Qty: 10 ML | Refills: 0 | Status: SHIPPED | OUTPATIENT
Start: 2023-08-17 | End: 2023-10-06

## 2023-10-05 ENCOUNTER — OFFICE VISIT (OUTPATIENT)
Dept: OPHTHALMOLOGY | Facility: CLINIC | Age: 73
End: 2023-10-05
Payer: MEDICARE

## 2023-10-05 DIAGNOSIS — H40.1132 PRIMARY OPEN ANGLE GLAUCOMA (POAG) OF BOTH EYES, MODERATE STAGE: Primary | ICD-10-CM

## 2023-10-05 DIAGNOSIS — M35.01 KERATITIS SICCA, BILATERAL: ICD-10-CM

## 2023-10-05 DIAGNOSIS — Z96.1 PSEUDOPHAKIA OF BOTH EYES: ICD-10-CM

## 2023-10-05 PROCEDURE — 1159F PR MEDICATION LIST DOCUMENTED IN MEDICAL RECORD: ICD-10-PCS | Mod: CPTII,S$GLB,, | Performed by: OPHTHALMOLOGY

## 2023-10-05 PROCEDURE — 1160F PR REVIEW ALL MEDS BY PRESCRIBER/CLIN PHARMACIST DOCUMENTED: ICD-10-PCS | Mod: CPTII,S$GLB,, | Performed by: OPHTHALMOLOGY

## 2023-10-05 PROCEDURE — 99214 OFFICE O/P EST MOD 30 MIN: CPT | Mod: S$GLB,,, | Performed by: OPHTHALMOLOGY

## 2023-10-05 PROCEDURE — 99999 PR PBB SHADOW E&M-EST. PATIENT-LVL III: ICD-10-PCS | Mod: PBBFAC,,, | Performed by: OPHTHALMOLOGY

## 2023-10-05 PROCEDURE — 1160F RVW MEDS BY RX/DR IN RCRD: CPT | Mod: CPTII,S$GLB,, | Performed by: OPHTHALMOLOGY

## 2023-10-05 PROCEDURE — 1159F MED LIST DOCD IN RCRD: CPT | Mod: CPTII,S$GLB,, | Performed by: OPHTHALMOLOGY

## 2023-10-05 PROCEDURE — 99214 PR OFFICE/OUTPT VISIT, EST, LEVL IV, 30-39 MIN: ICD-10-PCS | Mod: S$GLB,,, | Performed by: OPHTHALMOLOGY

## 2023-10-05 PROCEDURE — 99999 PR PBB SHADOW E&M-EST. PATIENT-LVL III: CPT | Mod: PBBFAC,,, | Performed by: OPHTHALMOLOGY

## 2023-10-05 NOTE — PROGRESS NOTES
SUBJECTIVE  Gris TIAN Jin is 73 y.o. female  Uncorrected distance visual acuity was 20/30 in the right eye and 20/20 in the left eye.   Chief Complaint   Patient presents with    Glaucoma     Pt here for 4m IOP chk. No pain or discomfort. VA stable. 100% compliant with gtts.           HPI     Glaucoma     Additional comments: Pt here for 4m IOP chk. No pain or discomfort. VA   stable. 100% compliant with gtts.            Comments    1. Mod COAG OS>OD Goal=15-16 ? OF LTG COMPT.   SLT OD 8/07 (25-16) + 2/15/17 (17.5-15.5) + 10/20/21 (16-13)  SLT OS 2/15/17 (18-16) + 10/20/21 (20-17)  Rhopressa OU qhs-intolerant -fb sensation,redness   ? Steriod Response 17/17 on 12/19 after steroid shot   2. Dry eyes   (Xiidra - too expensive)   3. Restor IOL OU w/YAG OS (readers for fine print)   4. DM x 2010   5. Trichiasis OD       Dorzolamide/Timolol BID OU   Vyzulta QHS OU   Systane PRN OU   O3FO          Last edited by Jayce Coto MA on 10/5/2023  2:30 PM.         Assessment /Plan :  1. Primary open angle glaucoma (POAG) of both eyes, moderate stage Intraocular pressure (IOP) not within acceptable range relative to target IOP with risk of irreversible visual loss. Better IOP control is recommended. Treatment options may include change or additional medications, Selective Laser Trabeculoplasty (SLT laser), and/or incisional glaucoma surgery. Reviewed importance of continued compliance with treatment and follow up.     Possible steroid response  Patient chooses defer and recheck IOP next visit, continue with current treatment    Return to clinic in 4 months  or as needed.  With IOP Check and GOCT     2. Pseudophakia of both eyes  -- Condition stable, no therapeutic change required. Monitoring routinely.     3. Keratitis sicca, bilateral  -- Condition stable, no therapeutic change required. Monitoring routinely.

## 2023-10-06 DIAGNOSIS — H40.1132 PRIMARY OPEN ANGLE GLAUCOMA OF BOTH EYES, MODERATE STAGE: ICD-10-CM

## 2023-10-06 RX ORDER — DORZOLAMIDE HYDROCHLORIDE AND TIMOLOL MALEATE 20; 5 MG/ML; MG/ML
1 SOLUTION/ DROPS OPHTHALMIC 2 TIMES DAILY
Qty: 10 ML | Refills: 5 | Status: SHIPPED | OUTPATIENT
Start: 2023-10-06

## 2023-11-02 ENCOUNTER — TELEPHONE (OUTPATIENT)
Dept: OPHTHALMOLOGY | Facility: CLINIC | Age: 73
End: 2023-11-02
Payer: MEDICARE

## 2023-11-02 NOTE — TELEPHONE ENCOUNTER
----- Message from Arlette Ochoa sent at 11/2/2023 10:48 AM CDT -----  Contact: Pt @[phone]  [email or web address]     Patient is calling for Medical Advice regarding:    Visual changes need to be reported and also have some additional questions    Would like response via [employer]: call back     Comments:   Please call back to mary lou

## 2023-11-02 NOTE — TELEPHONE ENCOUNTER
Pt states she is finishing radiation next week and they gave her a mild steroid creme to use due to irritation.   Pt was told to continue the creme alog with eye drops at this time. She can call us back for IOP check in 3-5 weeks after stopping steroid creme if she desires- along with keep next appt as scheduled. Starr asked  me to print this message and also show Dr Treadwell on Monday when he returns

## 2023-12-08 RX ORDER — LATANOPROST 50 UG/ML
1 SOLUTION/ DROPS OPHTHALMIC DAILY
Qty: 2.5 ML | Refills: 6 | Status: SHIPPED | OUTPATIENT
Start: 2023-12-08 | End: 2024-12-07

## 2023-12-08 RX ORDER — LATANOPROST 50 UG/ML
1 SOLUTION/ DROPS OPHTHALMIC
Qty: 9 ML | Refills: 0 | OUTPATIENT
Start: 2023-12-08

## 2023-12-08 NOTE — TELEPHONE ENCOUNTER
----- Message from Maryam Horton sent at 12/8/2023 10:58 AM CST -----  Contact: Abigail Ayoub is calling in regards to getting an refill on latanoprost 0.005 % ophthalmic solution.please call back at .[phone]           .  [facility] - [place] - [address] [facility]  [address] [facility]  [place] [address]  Phone: [phone] Fax: [phone]    Thanks  MORA

## 2024-02-05 ENCOUNTER — OFFICE VISIT (OUTPATIENT)
Dept: OPHTHALMOLOGY | Facility: CLINIC | Age: 74
End: 2024-02-05
Payer: MEDICARE

## 2024-02-05 DIAGNOSIS — H40.1132 PRIMARY OPEN ANGLE GLAUCOMA (POAG) OF BOTH EYES, MODERATE STAGE: Primary | ICD-10-CM

## 2024-02-05 DIAGNOSIS — M35.01 KERATITIS SICCA, BILATERAL: ICD-10-CM

## 2024-02-05 DIAGNOSIS — Z96.1 PSEUDOPHAKIA OF BOTH EYES: ICD-10-CM

## 2024-02-05 PROCEDURE — 1159F MED LIST DOCD IN RCRD: CPT | Mod: CPTII,S$GLB,, | Performed by: OPHTHALMOLOGY

## 2024-02-05 PROCEDURE — 1160F RVW MEDS BY RX/DR IN RCRD: CPT | Mod: CPTII,S$GLB,, | Performed by: OPHTHALMOLOGY

## 2024-02-05 PROCEDURE — 92133 CPTRZD OPH DX IMG PST SGM ON: CPT | Mod: S$GLB,,, | Performed by: OPHTHALMOLOGY

## 2024-02-05 PROCEDURE — 99999 PR PBB SHADOW E&M-EST. PATIENT-LVL I: CPT | Mod: PBBFAC,,, | Performed by: OPHTHALMOLOGY

## 2024-02-05 PROCEDURE — 99214 OFFICE O/P EST MOD 30 MIN: CPT | Mod: S$GLB,,, | Performed by: OPHTHALMOLOGY

## 2024-02-05 RX ORDER — BRIMONIDINE TARTRATE 2 MG/ML
1 SOLUTION/ DROPS OPHTHALMIC 2 TIMES DAILY
Qty: 10 ML | Refills: 4 | Status: SHIPPED | OUTPATIENT
Start: 2024-02-05 | End: 2025-02-04

## 2024-02-05 NOTE — PROGRESS NOTES
SUBJECTIVE  Gris TIAN Jin is 73 y.o. female  Uncorrected distance visual acuity was 20/40 -2 in the right eye and 20/25 in the left eye.   Chief Complaint   Patient presents with    Glaucoma     Pt reports for 4m IOP check w/GOCT. Denies any pain or discomfort. Va stable. 100% compliant with gtts.             HPI     Glaucoma     Additional comments: Pt reports for 4m IOP check w/GOCT. Denies any pain   or discomfort. Va stable. 100% compliant with gtts.              Comments      1. Mod COAG OS>OD Goal=15-16 ? OF LTG COMPT.   SLT OD 8/07 (25-16) + 2/15/17 (17.5-15.5) + 10/20/21 (16-13)  SLT OS 2/15/17 (18-16) + 10/20/21 (20-17)  Rhopressa OU qhs-intolerant -fb sensation,redness   ? Steriod Response 17/17 on 12/19 after steroid shot   2. Dry eyes   (Xiidra - too expensive)   3. Restor IOL OU w/YAG OS (readers for fine print)   4. DM x 2010   5. Trichiasis OD       Dorzolamide/Timolol BID OU   Vyzulta QHS OU   Systane PRN OU   O3FO             Last edited by Ellis Amaro on 2/5/2024  2:31 PM.         Assessment /Plan :  1. Primary open angle glaucoma (POAG) of both eyes, moderate stage Intraocular pressure (IOP) not within acceptable range relative to target IOP with risk of irreversible visual loss. Better IOP control is recommended. Treatment options may include change or additional medications, Selective Laser Trabeculoplasty (SLT laser), and/or incisional glaucoma surgery. Reviewed importance of continued compliance with treatment and follow up.     Patient chooses change glaucoma medication by adding Brimonidine BID OU       Return to clinic in 1 month  or as needed.  With IOP Check     2. Pseudophakia of both eyes  -- Condition stable, no therapeutic change required. Monitoring routinely.     3. Keratitis sicca, bilateral  -- Condition stable, no therapeutic change required. Monitoring routinely.

## 2024-03-11 ENCOUNTER — DOCUMENTATION ONLY (OUTPATIENT)
Dept: OPHTHALMOLOGY | Facility: CLINIC | Age: 74
End: 2024-03-11
Payer: MEDICARE

## 2024-03-11 ENCOUNTER — OFFICE VISIT (OUTPATIENT)
Dept: OPHTHALMOLOGY | Facility: CLINIC | Age: 74
End: 2024-03-11
Payer: MEDICARE

## 2024-03-11 DIAGNOSIS — H40.1132 PRIMARY OPEN ANGLE GLAUCOMA (POAG) OF BOTH EYES, MODERATE STAGE: Primary | ICD-10-CM

## 2024-03-11 DIAGNOSIS — Z96.1 PSEUDOPHAKIA OF BOTH EYES: ICD-10-CM

## 2024-03-11 DIAGNOSIS — M35.01 KERATITIS SICCA, BILATERAL: ICD-10-CM

## 2024-03-11 PROCEDURE — 1159F MED LIST DOCD IN RCRD: CPT | Mod: CPTII,S$GLB,, | Performed by: OPHTHALMOLOGY

## 2024-03-11 PROCEDURE — 99214 OFFICE O/P EST MOD 30 MIN: CPT | Mod: S$GLB,,, | Performed by: OPHTHALMOLOGY

## 2024-03-11 PROCEDURE — 1126F AMNT PAIN NOTED NONE PRSNT: CPT | Mod: CPTII,S$GLB,, | Performed by: OPHTHALMOLOGY

## 2024-03-11 PROCEDURE — 1160F RVW MEDS BY RX/DR IN RCRD: CPT | Mod: CPTII,S$GLB,, | Performed by: OPHTHALMOLOGY

## 2024-03-11 PROCEDURE — 99999 PR PBB SHADOW E&M-EST. PATIENT-LVL III: CPT | Mod: PBBFAC,,, | Performed by: OPHTHALMOLOGY

## 2024-03-11 RX ORDER — PREDNISOLONE ACETATE 10 MG/ML
1 SUSPENSION/ DROPS OPHTHALMIC 4 TIMES DAILY
Qty: 5 ML | Refills: 2 | Status: SHIPPED | OUTPATIENT
Start: 2024-03-11

## 2024-03-11 RX ORDER — GATIFLOXACIN 5 MG/ML
1 SOLUTION/ DROPS OPHTHALMIC EVERY 12 HOURS
Qty: 2.5 ML | Refills: 0 | Status: SHIPPED | OUTPATIENT
Start: 2024-03-11

## 2024-03-11 NOTE — PROGRESS NOTES
SUBJECTIVE  Gris Jin is 73 y.o. female  Uncorrected distance visual acuity was 20/40 -1 in the right eye and 20/25 -2 in the left eye.   Chief Complaint   Patient presents with    Glaucoma     1 month POAG IOP check. Added Brimonidine. VA is stable. Occasionally see some blind spots. No pain or irrittion. Compliant with gtts.          HPI     Glaucoma     Additional comments: 1 month POAG IOP check. Added Brimonidine. VA is   stable. Occasionally see some blind spots. No pain or irrittion. Compliant   with gtts.           Comments    Brittany Guerra PCP (needs letters)     1. Mod COAG OS>OD Goal=15-16 ? OF LTG COMPT.   SLT OD 8/07 (25-16) + 2/15/17 (17.5-15.5) + 10/20/21 (16-13)  SLT OS 2/15/17 (18-16) + 10/20/21 (20-17)  Rhopressa OU qhs-intolerant -fb sensation,redness   ? Steriod Response 17/17 on 12/19 after steroid shot   2. Dry eyes   (Xiidra - too expensive)   3. Restor IOL OU w/YAG OS (readers for fine print)   4. DM x 2010   5. Trichiasis OD       Dorzolamide/Timolol BID OU   Vyzulta QHS OU  Brimonidine BID OU   Systane PRN OU   O3FO             Last edited by Glenn Sanders on 3/11/2024  3:01 PM.         Assessment /Plan :  1. Primary open angle glaucoma (POAG) of both eyes, moderate stage Intraocular pressure (IOP) not within acceptable range relative to target IOP with risk of irreversible visual loss. Better IOP control is recommended. Treatment options may include change or additional medications, Selective Laser Trabeculoplasty (SLT laser), and/or incisional glaucoma surgery. Reviewed importance of continued compliance with treatment and follow up.     Patient chooses schedule XenGel OS and stop blood thinners (patient to confer with prescribing provider if any questions)       2. Pseudophakia of both eyes  -- Condition stable, no therapeutic change required. Monitoring routinely.     3. Keratitis sicca, bilateral  -- Condition stable, no therapeutic change required.  Monitoring routinely.

## 2024-03-11 NOTE — PROGRESS NOTES
Short Stay Record    CC: Uncontrolled glaucoma OS    HPI:  Gris Jin is a 73 y.o. female who presents c/o symptomatic blurring of vision and uncontrolled glaucoma in the left eye.    Past Medical History:   Diagnosis Date    Diabetes mellitus     Open angle glaucoma cupping of optic discs Goal 15-16     No past surgical history on file.  Review of patient's allergies indicates:   Allergen Reactions    Atorvastatin Other (See Comments)     Muscle pain       Current Outpatient Medications:     alendronate (FOSAMAX) 35 MG tablet, Take 35 mg by mouth every 7 days., Disp: , Rfl:     aspirin (ECOTRIN) 81 MG EC tablet, Take 81 mg by mouth., Disp: , Rfl:     brimonidine 0.2% (ALPHAGAN) 0.2 % Drop, Place 1 drop into both eyes 2 (two) times daily., Disp: 10 mL, Rfl: 4    calcium carbonate (OS-DEYSI) 500 mg calcium (1,250 mg) tablet, Take 1,250 mg by mouth., Disp: , Rfl:     cycloSPORINE (RESTASIS) 0.05 % ophthalmic emulsion, Place 1 drop into both eyes 2 (two) times daily., Disp: 60 each, Rfl: 12    dorzolamide-timolol 2-0.5% (COSOPT) 22.3-6.8 mg/mL ophthalmic solution, INSTILL 1 DROP INTO EACH EYE TWICE DAILY, Disp: 10 mL, Rfl: 5    fish oil-omega-3 fatty acids 300-1,000 mg capsule, Take 2 g by mouth once daily., Disp: , Rfl:     fluconazole (DIFLUCAN) 150 MG Tab, Take one tablet and repeat dose in 3 days, Disp: 2 tablet, Rfl: 1    gatifloxacin 0.5 % Drop drops, Place 1 drop into the left eye every 12 (twelve) hours. Start using the day before surgery, Disp: 2.5 mL, Rfl: 0    ketorolac 0.5% (ACULAR) 0.5 % Drop, Put one eyedrop in both eye four times a day. Start first drop morning of laser and stop after five days., Disp: 1 Bottle, Rfl: 0    latanoprost 0.005 % ophthalmic solution, Place 1 drop into both eyes once daily., Disp: 2.5 mL, Rfl: 6    losartan (COZAAR) 25 MG tablet, Take 25 mg by mouth once daily., Disp: , Rfl:     lovastatin (MEVACOR) 20 MG tablet, , Disp: , Rfl:     meloxicam (MOBIC) 15 MG tablet,  Take 15 mg by mouth once daily., Disp: , Rfl:     metformin (GLUCOPHAGE) 1000 MG tablet, , Disp: , Rfl:     pioglitazone (ACTOS) 30 MG tablet, TAKE 1 TABLET BY MOUTH ONCE DAILY, Disp: , Rfl:     prednisoLONE acetate (PRED FORTE) 1 % DrpS, Place 1 drop into the left eye 4 (four) times daily. Start using the day before surgery, Disp: 5 mL, Rfl: 2    sitagliptin-metformin 50-1,000 mg TM24, Take by mouth., Disp: , Rfl:     travoprost (TRAVATAN Z) 0.004 % ophthalmic solution, INSTILL 1  DROP INTO AFFECTED EYE ONCE DAILY IN THE EVENING, Disp: 3 mL, Rfl: 0    triamcinolone acetonide 0.025% (KENALOG) 0.025 % Oint, Apply topically 2 (two) times daily. for 10 days, Disp: 15 g, Rfl: 1    zolpidem (AMBIEN) 10 mg Tab, , Disp: , Rfl:     Review of Systems:  Pertinent items are noted in the chief complaint and history of present illness.    Physical Exam:  General Appearance:    A&Ox3, no distress, appears stated age   Head:    Normocephalic, without obvious abnormality, atraumatic   Lungs:     respirations unlabored   Chest Wall:    No tenderness or deformity   Base Eye Exam    Visual Acuity (Snellen - Linear)     Right Left   Dist sc 20/40 -1 20/25 -2     Tonometry (Applanation, 3:14 PM)     Right Left   Pressure 19 21     Target Pressure     Right Left   Target 15-16 15-16    Neuro/Psych    Oriented x3: Yes   Mood/Affect: Normal      Edited by: Glenn Sanders; Harrison Treadwell MD        Slit Lamp and Fundus Exam      Slit Lamp Exam     Right Left   Lids/Lashes Normal Normal   Conjunctiva/Sclera Nasal , Pinguecula, Trace Injection Pinguecula: Nasal, Trace Injection   Cornea debris in tear film debris in tear film   Anterior Chamber Deep and quiet Deep and quiet   Iris Round and reactive Round and reactive   Lens ReStor IOL ReStor IOL   Vitreous PVD PVD   Edited by: Harrison Treadwell MD        Fundus Exam     Right Left   Disc Normal Cup Sloping: Inferotemporal   C/D Ratio 0.7 0.85   Macula No Diabetic Retinopathy No  Diabetic Retinopathy   Vessels Normal Normal   Periphery No diabetic retinopathy No diabetic retinopathy   Edited by: Harrison Treadwell MD         Impression: Uncontrolled glaucoma in the right eye.    Planned Procedure: XenGel Drain / Bleb Needling / MMC OS and discontinuation of blood thinners if appropriate after discussion with prescribing physician    Patient chooses schedule XenGel OS and stop blood thinners (patient to confer with prescribing provider if any questions)       Discharge Summary:  Admitting Diagnosis: Uncontrolled glaucoma OS  Discharge Diagnosis: Glaucoma OS  Procedure:  XenGel Drain / Bleb Needling / MMC OS  Complications: None  Discharge Condition: Stable  Discharge instructions: Follow post-operative discharge instruction sheet per provider.  Follow-up: Return to clinic next day or as scheduled.

## 2024-04-17 ENCOUNTER — OUTSIDE PLACE OF SERVICE (OUTPATIENT)
Dept: OPHTHALMOLOGY | Facility: CLINIC | Age: 74
End: 2024-04-17
Payer: MEDICARE

## 2024-04-17 PROCEDURE — 66183 INSERT ANT DRAINAGE DEVICE: CPT | Mod: LT,,, | Performed by: OPHTHALMOLOGY

## 2024-04-18 ENCOUNTER — OFFICE VISIT (OUTPATIENT)
Dept: OPHTHALMOLOGY | Facility: CLINIC | Age: 74
End: 2024-04-18
Payer: MEDICARE

## 2024-04-18 DIAGNOSIS — Z98.890 POST-OPERATIVE STATE: Primary | ICD-10-CM

## 2024-04-18 PROCEDURE — 1160F RVW MEDS BY RX/DR IN RCRD: CPT | Mod: CPTII,S$GLB,, | Performed by: OPHTHALMOLOGY

## 2024-04-18 PROCEDURE — 99999 PR PBB SHADOW E&M-EST. PATIENT-LVL III: CPT | Mod: PBBFAC,,, | Performed by: OPHTHALMOLOGY

## 2024-04-18 PROCEDURE — 99024 POSTOP FOLLOW-UP VISIT: CPT | Mod: S$GLB,,, | Performed by: OPHTHALMOLOGY

## 2024-04-18 PROCEDURE — 1159F MED LIST DOCD IN RCRD: CPT | Mod: CPTII,S$GLB,, | Performed by: OPHTHALMOLOGY

## 2024-04-18 NOTE — PROGRESS NOTES
SUBJECTIVE  Gris TIAN Jin is 73 y.o. female  Uncorrected distance visual acuity was not recorded in the right eye and 20/50 in the left eye.   Chief Complaint   Patient presents with    Post-op Evaluation     Pt here for 1 day xen gel OS. No pain or discomfort.           HPI     Post-op Evaluation     Additional comments: Pt here for 1 day xen gel OS. No pain or discomfort.              Comments    1. Mod COAG OS>OD Goal=15-16 ? OF LTG COMPT.   SLT OD 8/07 (25-16) + 2/15/17 (17.5-15.5) + 10/20/21 (16-13)  SLT OS 2/15/17 (18-16) + 10/20/21 (20-17)  Rhopressa OU qhs-intolerant -fb sensation,redness   ? Steriod Response 17/17 on 12/19 after steroid shot   2. Dry eyes   (Xiidra - too expensive)   3. Restor IOL OU w/YAG OS (readers for fine print)   4. DM x 2010   5. Trichiasis OD     Gatifloxacin BID OS  Pred QID OS    Dorzolamide/Timolol BID OU   Vyzulta QHS OU  Brimonidine BID OU   Systane PRN OU   O3FO             Last edited by Jayce Coto MA on 4/18/2024  8:28 AM.         Assessment /Plan :  1. Post-operative state 1 day post Xen gel OS - doing well  Continue Prednisolone Acetate one drop in the left eye 4 times a day and Gatifloxacin one drop in the left eye 2 times a day  Discontinue glaucoma eyedrops in the left eye  Continue Cosopt one drop in the right eye 2 times a day, Vyzulta one drop in the right eye every night, and Brimonidine one drop in the right eye 2 times a day     RTC on Monday or prn for any changes

## 2024-04-22 ENCOUNTER — OFFICE VISIT (OUTPATIENT)
Dept: OPHTHALMOLOGY | Facility: CLINIC | Age: 74
End: 2024-04-22
Payer: MEDICARE

## 2024-04-22 DIAGNOSIS — Z98.890 POST-OPERATIVE STATE: Primary | ICD-10-CM

## 2024-04-22 PROCEDURE — 99999 PR PBB SHADOW E&M-EST. PATIENT-LVL I: CPT | Mod: PBBFAC,,, | Performed by: OPHTHALMOLOGY

## 2024-04-22 PROCEDURE — 1160F RVW MEDS BY RX/DR IN RCRD: CPT | Mod: CPTII,S$GLB,, | Performed by: OPHTHALMOLOGY

## 2024-04-22 PROCEDURE — 99024 POSTOP FOLLOW-UP VISIT: CPT | Mod: S$GLB,,, | Performed by: OPHTHALMOLOGY

## 2024-04-22 PROCEDURE — 1159F MED LIST DOCD IN RCRD: CPT | Mod: CPTII,S$GLB,, | Performed by: OPHTHALMOLOGY

## 2024-04-22 NOTE — PROGRESS NOTES
SUBJECTIVE  Gris TIAN Jin is 73 y.o. female  Uncorrected distance visual acuity was 20/40 +1 in the right eye and 20/30 in the left eye.   Chief Complaint   Patient presents with    Post-op Evaluation     1 week s/p xen gel 4/17/24    Pt co slight itch, redness, and irritation OS          HPI     Post-op Evaluation     Additional comments: 1 week s/p xen gel 4/17/24    Pt co slight itch, redness, and irritation OS           Comments    Brittany Guerra PCP (needs letters)     1. Mod COAG OS>OD Goal=15-16 ? OF LTG COMPT.   SLT OD 8/07 (25-16) + 2/15/17 (17.5-15.5) + 10/20/21 (16-13)  SLT OS 2/15/17 (18-16) + 10/20/21 (20-17)  Rhopressa OU qhs-intolerant -fb sensation,redness   ? Steriod Response 17/17 on 12/19 after steroid shot   Xen gel OS 4/17/24  2. Dry eyes   (Xiidra - too expensive)   3. Restor IOL OU w/YAG OS (readers for fine print)   4. DM x 2010   5. Trichiasis OD         Dorzolamide/Timolol BID OD  Vyzulta QHS OD  Brimonidine BID OD  Systane PRN OU   O3FO   Systane rene qhs   Pred F QID OS  Gati BID OS             Last edited by Anil Perez on 4/22/2024  1:41 PM.         Assessment /Plan :  1. Post-operative state s/p Xen gel OS - doing well  Discontinue Gatifloxacin on Thursday  Continue Prednisolone Acetate one drop in the left eye 4 times a day  Start gentle massage left eye every 3 hours as directed  Continue Dorzolamide/Timolol one drop in the right eye 2 times a day, Vyzulta one drop in the right eye every night, Brimonidine one drop in the right eye 2 times a day   Return to clinic in 10 days  or as needed

## 2024-04-30 ENCOUNTER — OFFICE VISIT (OUTPATIENT)
Dept: OPHTHALMOLOGY | Facility: CLINIC | Age: 74
End: 2024-04-30
Payer: MEDICARE

## 2024-04-30 DIAGNOSIS — Z98.890 POST-OPERATIVE STATE: Primary | ICD-10-CM

## 2024-04-30 PROCEDURE — 1160F RVW MEDS BY RX/DR IN RCRD: CPT | Mod: CPTII,S$GLB,, | Performed by: OPHTHALMOLOGY

## 2024-04-30 PROCEDURE — 99024 POSTOP FOLLOW-UP VISIT: CPT | Mod: S$GLB,,, | Performed by: OPHTHALMOLOGY

## 2024-04-30 PROCEDURE — 1159F MED LIST DOCD IN RCRD: CPT | Mod: CPTII,S$GLB,, | Performed by: OPHTHALMOLOGY

## 2024-04-30 PROCEDURE — 99999 PR PBB SHADOW E&M-EST. PATIENT-LVL III: CPT | Mod: PBBFAC,,, | Performed by: OPHTHALMOLOGY

## 2024-04-30 NOTE — PROGRESS NOTES
SUBJECTIVE  Gris TIAN Jin is 73 y.o. female  Uncorrected distance visual acuity was 20/40 +1 in the right eye and 20/40 +2 in the left eye.   Chief Complaint   Patient presents with    Pre-op Exam     Pt here for 10 day Xen gel OS PO. No pain or discomfort. VA stable. 100% compliant with gtts.           HPI     Pre-op Exam     Additional comments: Pt here for 10 day Xen gel OS PO. No pain or   discomfort. VA stable. 100% compliant with gtts.            Comments    1. Mod COAG OS>OD Goal=15-16 ? OF LTG COMPT.   SLT OD 8/07 (25-16) + 2/15/17 (17.5-15.5) + 10/20/21 (16-13)  SLT OS 2/15/17 (18-16) + 10/20/21 (20-17)  Rhopressa OU qhs-intolerant -fb sensation,redness   ? Steriod Response 17/17 on 12/19 after steroid shot   Xen gel OS 4/17/24  2. Dry eyes   (Xiidra - too expensive)   3. Restor IOL OU w/YAG OS (readers for fine print)   4. DM x 2010   5. Trichiasis OD     Dorzolamide/Timolol BID OD  Vyzulta QHS OD  Brimonidine BID OD  Systane PRN OU   O3FO    Systane rene qhs -per pt   Pred F QID OS  Gati BID OS             Last edited by Jayce Coto MA on 4/30/2024  1:00 PM.         Assessment /Plan :  1. Post-operative state S/p xen gel OS, Doing well    Decrease Pred to TID    Continue:  Dorzolamide/Timolol BID OD  Vyzulta QHS OD  Brimonidine BID OD  Systane PRN OU   O3FO    Systane rene qhs -per pt   Pred TID OS  Massage Q2-3H     Return to clinic in 2 weeks with IOP check or sooner PRN

## 2024-05-13 ENCOUNTER — OFFICE VISIT (OUTPATIENT)
Dept: OPHTHALMOLOGY | Facility: CLINIC | Age: 74
End: 2024-05-13
Payer: MEDICARE

## 2024-05-13 DIAGNOSIS — Z98.890 POST-OPERATIVE STATE: Primary | ICD-10-CM

## 2024-05-13 PROCEDURE — 1160F RVW MEDS BY RX/DR IN RCRD: CPT | Mod: CPTII,S$GLB,, | Performed by: OPHTHALMOLOGY

## 2024-05-13 PROCEDURE — 99999 PR PBB SHADOW E&M-EST. PATIENT-LVL III: CPT | Mod: PBBFAC,,, | Performed by: OPHTHALMOLOGY

## 2024-05-13 PROCEDURE — 99024 POSTOP FOLLOW-UP VISIT: CPT | Mod: S$GLB,,, | Performed by: OPHTHALMOLOGY

## 2024-05-13 PROCEDURE — 1159F MED LIST DOCD IN RCRD: CPT | Mod: CPTII,S$GLB,, | Performed by: OPHTHALMOLOGY

## 2024-05-13 NOTE — PROGRESS NOTES
SUBJECTIVE  Gris Jin is 73 y.o. female  Uncorrected distance visual acuity was 20/25 - in the right eye and 20/30 in the left eye.   Chief Complaint   Patient presents with    Post-op Evaluation     2 wk s/p Xen gel OS 4/17/24    Pt states she is using all gtts including Pred as directed  Pt states she went on a trip and only got to massage OS x3-4 hr  No pain  No visual disturbances           HPI     Post-op Evaluation     Additional comments: 2 wk s/p Xen gel OS 4/17/24    Pt states she is using all gtts including Pred as directed  Pt states she went on a trip and only got to massage OS x3-4 hr  No pain  No visual disturbances            Comments    Brittany Guerra PCP (needs letters)     1. Mod COAG OS>OD Goal=15-16 ? OF LTG COMPT.   SLT OD 8/07 (25-16) + 2/15/17 (17.5-15.5) + 10/20/21 (16-13)  SLT OS 2/15/17 (18-16) + 10/20/21 (20-17)  Rhopressa OU qhs-intolerant -fb sensation,redness   ? Steriod Response 17/17 on 12/19 after steroid shot   Xen gel OS 4/17/24  2. Dry eyes   (Xiidra - too expensive)   3. Restor IOL OU w/YAG OS (readers for fine print)   4. DM x 2010   5. Trichiasis OD     Dorzolamide/Timolol BID OD  Vyzulta QHS OD  Brimonidine BID OD  Systane PRN OU   O3FO    Systane rene qhs -per pt   Pred TID OS  Massage Q2-3H             Last edited by Anil Perez on 5/13/2024  8:52 AM.         Assessment /Plan :  1. Post-operative state S/p xen gel OS, doing well    Decrease Pred to BID OS    Continue:  Dorzolamide/Timolol BID OD  Vyzulta QHS OD  Brimonidine BID OD  Systane PRN OU  O3FO    Systane rene qhs -per pt  Pred BID OS  Massage Q2-3H    Return to clinic in 3 weeks or sooner PRN

## 2024-06-04 ENCOUNTER — OFFICE VISIT (OUTPATIENT)
Dept: OPHTHALMOLOGY | Facility: CLINIC | Age: 74
End: 2024-06-04
Payer: MEDICARE

## 2024-06-04 DIAGNOSIS — Z98.890 POST-OPERATIVE STATE: Primary | ICD-10-CM

## 2024-06-04 PROCEDURE — 1160F RVW MEDS BY RX/DR IN RCRD: CPT | Mod: CPTII,S$GLB,, | Performed by: OPHTHALMOLOGY

## 2024-06-04 PROCEDURE — 99999 PR PBB SHADOW E&M-EST. PATIENT-LVL III: CPT | Mod: PBBFAC,,, | Performed by: OPHTHALMOLOGY

## 2024-06-04 PROCEDURE — 99024 POSTOP FOLLOW-UP VISIT: CPT | Mod: S$GLB,,, | Performed by: OPHTHALMOLOGY

## 2024-06-04 PROCEDURE — 1126F AMNT PAIN NOTED NONE PRSNT: CPT | Mod: CPTII,S$GLB,, | Performed by: OPHTHALMOLOGY

## 2024-06-04 PROCEDURE — 1159F MED LIST DOCD IN RCRD: CPT | Mod: CPTII,S$GLB,, | Performed by: OPHTHALMOLOGY

## 2024-06-04 PROCEDURE — 3044F HG A1C LEVEL LT 7.0%: CPT | Mod: CPTII,S$GLB,, | Performed by: OPHTHALMOLOGY

## 2024-06-04 NOTE — PROGRESS NOTES
SUBJECTIVE  Gris TIAN Jin is 73 y.o. female  Uncorrected distance visual acuity was 20/25 -1 in the right eye and 20/25 in the left eye.   Chief Complaint   Patient presents with    Post-op Evaluation     3 weeks folllow up on  post op Xen gel OS 4/17/24. VA is fair she state. No pain or irritation. Compliant with gtts. Need a Diabetic eye exam next year per PCP.          HPI     Post-op Evaluation     Additional comments: 3 weeks folllow up on  post op Xen gel OS 4/17/24.   VA is fair she state. No pain or irritation. Compliant with gtts. Need a   Diabetic eye exam next year per PCP.           Comments    Brittany Guerra PCP (needs letters)     1. Mod COAG OS>OD Goal=15-16 ? OF LTG COMPT.   SLT OD 8/07 (25-16) + 2/15/17 (17.5-15.5) + 10/20/21 (16-13)  SLT OS 2/15/17 (18-16) + 10/20/21 (20-17)  Rhopressa OU qhs-intolerant -fb sensation,redness   ? Steriod Response 17/17 on 12/19 after steroid shot   Xen gel OS 4/17/24  2. Dry eyes   (Xiidra - too expensive)   3. Restor IOL OU w/YAG OS (readers for fine print)   4. DM x 2010   5. Trichiasis OD     Dorzolamide/Timolol BID OD  Vyzulta QHS OD  Brimonidine BID OD  Systane PRN OU   O3FO    Systane rene qhs -per pt   Pred BID OS  Massage Q2-3H             Last edited by Glenn Sanders on 6/4/2024 10:13 AM.         Assessment /Plan :  1. Post-operative state s/p Xen gel OS doing well  Continue Prednisolone Acetate one drop in the left eye 2 times a day  Continue massaging the left eye as directed  Continue Dorzolamide/Timolol one drop in the right eye 2 times a day, Vyzulta one drop in the right eye every night  and Brimonidine one drop in the right eye 2 times a day     RTC in 6-8 weeks for IOP check

## 2024-07-16 ENCOUNTER — OFFICE VISIT (OUTPATIENT)
Dept: OPHTHALMOLOGY | Facility: CLINIC | Age: 74
End: 2024-07-16
Payer: MEDICARE

## 2024-07-16 DIAGNOSIS — Z98.890 POST-OPERATIVE STATE: Primary | ICD-10-CM

## 2024-07-16 PROCEDURE — 1126F AMNT PAIN NOTED NONE PRSNT: CPT | Mod: CPTII,S$GLB,, | Performed by: OPHTHALMOLOGY

## 2024-07-16 PROCEDURE — 1160F RVW MEDS BY RX/DR IN RCRD: CPT | Mod: CPTII,S$GLB,, | Performed by: OPHTHALMOLOGY

## 2024-07-16 PROCEDURE — 1159F MED LIST DOCD IN RCRD: CPT | Mod: CPTII,S$GLB,, | Performed by: OPHTHALMOLOGY

## 2024-07-16 PROCEDURE — 3044F HG A1C LEVEL LT 7.0%: CPT | Mod: CPTII,S$GLB,, | Performed by: OPHTHALMOLOGY

## 2024-07-16 PROCEDURE — 99999 PR PBB SHADOW E&M-EST. PATIENT-LVL III: CPT | Mod: PBBFAC,,, | Performed by: OPHTHALMOLOGY

## 2024-07-16 PROCEDURE — 99024 POSTOP FOLLOW-UP VISIT: CPT | Mod: S$GLB,,, | Performed by: OPHTHALMOLOGY

## 2024-07-16 NOTE — PROGRESS NOTES
SUBJECTIVE  Gris TIAN Jin is 73 y.o. female  Uncorrected distance visual acuity was 20/25 -2 in the right eye and 20/25 -2 in the left eye.   Chief Complaint   Patient presents with    Glaucoma     6-8 weeks IOP check. VA is stable, no changes. No pain or irritation. Compliant with gtts.          HPI     Glaucoma     Additional comments: 6-8 weeks IOP check. VA is stable, no changes. No   pain or irritation. Compliant with gtts.           Comments        1. Mod COAG OS>OD Goal=15-16 ? OF LTG COMPT.   SLT OD 8/07 (25-16) + 2/15/17 (17.5-15.5) + 10/20/21 (16-13)  SLT OS 2/15/17 (18-16) + 10/20/21 (20-17)  Rhopressa OU qhs-intolerant -fb sensation,redness   ? Steriod Response 17/17 on 12/19 after steroid shot   Xen gel OS 4/17/24  2. Dry eyes   (Xiidra - too expensive)   3. Restor IOL OU w/YAG OS (readers for fine print)   4. DM x 2010   5. Trichiasis OD     Dorzolamide/Timolol BID OD  Vyzulta QHS OD  Brimonidine BID OD  Systane PRN OU   O3FO    Systane rene qhs -per pt   Pred BID OS  Massage Q2-3H             Last edited by Harrison Treadwell MD on 7/16/2024 11:28 AM.         Assessment /Plan :  1. Post-operative state s/p Xen gel OS - doing well  Decrease Prednisolone Acetate to one drop in the left eye once daily  Continue massaging the left eye as directed  Continue Dorzolamide/Timolol one drop in the right eye 2 times a day, Vyzulta one drop in the right eye every night and Brimonidine one drop in the right eye 2 times a day     Return to clinic in  2-3 months   or as needed.  With IOP Check, GOCT, and HVF 24-2

## 2024-09-06 DIAGNOSIS — H40.1132 PRIMARY OPEN ANGLE GLAUCOMA (POAG) OF BOTH EYES, MODERATE STAGE: ICD-10-CM

## 2024-09-06 RX ORDER — PREDNISOLONE ACETATE 10 MG/ML
1 SUSPENSION/ DROPS OPHTHALMIC 4 TIMES DAILY
Qty: 5 ML | Refills: 0 | Status: SHIPPED | OUTPATIENT
Start: 2024-09-06

## 2024-09-30 RX ORDER — LATANOPROST 50 UG/ML
1 SOLUTION/ DROPS OPHTHALMIC
Qty: 3 ML | Refills: 3 | Status: SHIPPED | OUTPATIENT
Start: 2024-09-30

## 2024-10-11 ENCOUNTER — TELEPHONE (OUTPATIENT)
Dept: OPHTHALMOLOGY | Facility: CLINIC | Age: 74
End: 2024-10-11
Payer: MEDICARE

## 2024-10-11 NOTE — TELEPHONE ENCOUNTER
Pt called to say that she has been experiencing intermittent blurriness in both eyes. Asked pt if she uses AT's. Pt says she does not use them enough. Recommended pt use her AT's about 4-5 times a day and to let us know if that does not help in 1-2 weeks.     ----- Message from Brooke sent at 10/11/2024  1:29 PM CDT -----  Pt states she is having vision problems with her left would like to be advised she is schedule for up coming appt please call  phone   Best Call Back Number: [phone]

## 2024-11-19 ENCOUNTER — OFFICE VISIT (OUTPATIENT)
Dept: OPHTHALMOLOGY | Facility: CLINIC | Age: 74
End: 2024-11-19
Payer: MEDICARE

## 2024-11-19 DIAGNOSIS — E11.9 DIABETES MELLITUS TYPE 2 WITHOUT RETINOPATHY: ICD-10-CM

## 2024-11-19 DIAGNOSIS — H40.1132 PRIMARY OPEN ANGLE GLAUCOMA (POAG) OF BOTH EYES, MODERATE STAGE: Primary | ICD-10-CM

## 2024-11-19 DIAGNOSIS — Z96.1 PSEUDOPHAKIA OF BOTH EYES: ICD-10-CM

## 2024-11-19 DIAGNOSIS — H16.223 KERATITIS SICCA, BILATERAL: ICD-10-CM

## 2024-11-19 PROCEDURE — 99999 PR PBB SHADOW E&M-EST. PATIENT-LVL III: CPT | Mod: PBBFAC,,, | Performed by: OPHTHALMOLOGY

## 2024-11-19 PROCEDURE — 92083 EXTENDED VISUAL FIELD XM: CPT | Mod: S$GLB,,, | Performed by: OPHTHALMOLOGY

## 2024-11-19 PROCEDURE — 3044F HG A1C LEVEL LT 7.0%: CPT | Mod: CPTII,S$GLB,, | Performed by: OPHTHALMOLOGY

## 2024-11-19 PROCEDURE — 1159F MED LIST DOCD IN RCRD: CPT | Mod: CPTII,S$GLB,, | Performed by: OPHTHALMOLOGY

## 2024-11-19 PROCEDURE — 2023F DILAT RTA XM W/O RTNOPTHY: CPT | Mod: CPTII,S$GLB,, | Performed by: OPHTHALMOLOGY

## 2024-11-19 PROCEDURE — 99214 OFFICE O/P EST MOD 30 MIN: CPT | Mod: S$GLB,,, | Performed by: OPHTHALMOLOGY

## 2024-11-19 PROCEDURE — 92133 CPTRZD OPH DX IMG PST SGM ON: CPT | Mod: S$GLB,,, | Performed by: OPHTHALMOLOGY

## 2024-11-19 PROCEDURE — 1160F RVW MEDS BY RX/DR IN RCRD: CPT | Mod: CPTII,S$GLB,, | Performed by: OPHTHALMOLOGY

## 2024-11-19 NOTE — PROGRESS NOTES
SUBJECTIVE  Gris Jin is 74 y.o. female  Uncorrected distance visual acuity was 20/25 -2 in the right eye and 20/25 -2 in the left eye.   Chief Complaint   Patient presents with    Glaucoma     Pt here for 2m HVF GOCT IOP chk. No pain or discomfort. VA stable. 100% compliant with gtts.           HPI     Glaucoma     Additional comments: Pt here for 2m HVF GOCT IOP chk. No pain or   discomfort. VA stable. 100% compliant with gtts.            Comments    1. Mod COAG OS>OD Goal=15-16 ? OF LTG COMPT.   SLT OD 8/07 (25-16) + 2/15/17 (17.5-15.5) + 10/20/21 (16-13)  SLT OS 2/15/17 (18-16) + 10/20/21 (20-17)  Rhopressa OU qhs-intolerant -fb sensation,redness   ? Steriod Response 17/17 on 12/19 after steroid shot   Xen gel OS 4/17/24  2. Dry eyes   (Xiidra - too expensive)   3. Restor IOL OU w/YAG OS (readers for fine print)   4. DM x 2010   5. Trichiasis OD     Dorzolamide/Timolol BID OD  Vyzulta QHS OD  Brimonidine BID OD  Systane PRN OU   O3FO    Systane rene qhs -per pt   Pred BID OS  Massage Q2-3H             Last edited by Jayce Coto MA on 11/19/2024 10:09 AM.         Assessment /Plan :  1. Primary open angle glaucoma (POAG) of both eyes, moderate stage Doing well, intraocular pressure (IOP) within acceptable range relative to target IOP and no evidence of progression. Continue current treatment. Reviewed importance of continued compliance with treatment and follow up.      Patient instructed to continue using the following glaucoma medication as follows:  Vyzulta one drop in the right eye nightly, Brimoninide one drop in the right eye every 12 hours, Dorzolamide/Timolol (Cosopt) one drop in the right eye every 12 hours, and Prednisolone Acetate one drop in the left eye once daily. Continue massaging the left eye 4 times a day as directed    Return to clinic in 4 months  or as needed.  With IOP Check         2. Pseudophakia of both eyes  -- Condition stable, no therapeutic change required.  Monitoring routinely.     3. Keratitis sicca, bilateral  -- Condition stable, no therapeutic change required. Monitoring routinely.

## 2024-11-19 NOTE — PROGRESS NOTES
SUBJECTIVE  Gris TIAN Jin is 74 y.o. female  Uncorrected distance visual acuity was 20/25 -2 in the right eye and 20/25 -2 in the left eye.   Chief Complaint   Patient presents with    Glaucoma     Pt here for 2m HVF GOCT IOP chk. No pain or discomfort. VA stable. 100% compliant with gtts.           HPI     Glaucoma     Additional comments: Pt here for 2m HVF GOCT IOP chk. No pain or   discomfort. VA stable. 100% compliant with gtts.            Comments    1. Mod COAG OS>OD Goal=15-16 ? OF LTG COMPT.   SLT OD 8/07 (25-16) + 2/15/17 (17.5-15.5) + 10/20/21 (16-13)  SLT OS 2/15/17 (18-16) + 10/20/21 (20-17)  Rhopressa OU qhs-intolerant -fb sensation,redness   ? Steriod Response 17/17 on 12/19 after steroid shot   Xen gel OS 4/17/24  2. Dry eyes   (Xiidra - too expensive)   3. Restor IOL OU w/YAG OS (readers for fine print)   4. DM x 2010   5. Trichiasis OD     Dorzolamide/Timolol BID OD  Vyzulta QHS OD  Brimonidine BID OD  Systane PRN OU   O3FO    Systane rene qhs -per pt   Pred BID OS  Massage Q2-3H             Last edited by Jayce Coto MA on 11/19/2024 10:09 AM.         Assessment /Plan :  1. Primary open angle glaucoma (POAG) of both eyes, moderate stage   Doing well, intraocular pressure (IOP) within acceptable range relative to target IOP and no evidence of progression. Continue current treatment. Reviewed importance of continued compliance with treatment and follow up.      Return to clinic in 4 months  or as needed.with Dr. Solis  With IOP Check     2. Pseudophakia of both eyes    3. Keratitis sicca, bilateral    4. Diabetes mellitus type 2 without retinopathy

## 2024-12-11 DIAGNOSIS — H40.1132 PRIMARY OPEN ANGLE GLAUCOMA OF BOTH EYES, MODERATE STAGE: ICD-10-CM

## 2024-12-11 DIAGNOSIS — H40.1132 PRIMARY OPEN ANGLE GLAUCOMA (POAG) OF BOTH EYES, MODERATE STAGE: ICD-10-CM

## 2024-12-11 RX ORDER — DORZOLAMIDE HYDROCHLORIDE AND TIMOLOL MALEATE 20; 5 MG/ML; MG/ML
1 SOLUTION/ DROPS OPHTHALMIC 2 TIMES DAILY
Qty: 10 ML | Refills: 0 | Status: SHIPPED | OUTPATIENT
Start: 2024-12-11 | End: 2024-12-11 | Stop reason: SDUPTHER

## 2024-12-11 RX ORDER — DORZOLAMIDE HYDROCHLORIDE AND TIMOLOL MALEATE 20; 5 MG/ML; MG/ML
1 SOLUTION/ DROPS OPHTHALMIC 2 TIMES DAILY
Qty: 10 ML | Refills: 4 | Status: SHIPPED | OUTPATIENT
Start: 2024-12-11

## 2024-12-11 RX ORDER — PREDNISOLONE ACETATE 10 MG/ML
1 SUSPENSION/ DROPS OPHTHALMIC 2 TIMES DAILY
Qty: 5 ML | Refills: 1 | Status: SHIPPED | OUTPATIENT
Start: 2024-12-11

## 2024-12-11 RX ORDER — PREDNISOLONE ACETATE 10 MG/ML
SUSPENSION/ DROPS OPHTHALMIC
Qty: 5 ML | Refills: 0 | Status: SHIPPED | OUTPATIENT
Start: 2024-12-11 | End: 2024-12-11 | Stop reason: SDUPTHER

## 2024-12-11 NOTE — TELEPHONE ENCOUNTER
----- Message from Adriano sent at 2024  9:02 AM CST -----  Contact: rocco  Type:  RX Refill Request    Who Called: Rocco  Refill or New Rx: REFILL  RX Name and Strength: prednisoLONE acetate (PRED FORTE) 1 % DrpS / dorzolamide-timolol 2-0.5% (COSOPT) 22.3-6.8 mg/mL ophthalmic solution   How is the patient currently taking it? (ex. Day):   Is this a 30 day or 90 day RX:  Preferred Pharmacy with phone number:     [facility] - [place], [place] - [address] [facility]  [address] [facility]  [place] [address]  Phone: [phone] Fax: [phone]    Local or Mail Order:LOCAL  Ordering Provider:   Would the patient rather a call back or a response via [facility]?  CALL  Best Call Back Number: [phone]   Additional Information:  PATIENT STATES THE RX HAS  AND SHE IS OUT, WILL BE OUT OF TOWN FOR 5 DAYS

## 2025-01-12 DIAGNOSIS — H40.1132 PRIMARY OPEN ANGLE GLAUCOMA OF BOTH EYES, MODERATE STAGE: ICD-10-CM

## 2025-01-13 RX ORDER — DORZOLAMIDE HYDROCHLORIDE AND TIMOLOL MALEATE 20; 5 MG/ML; MG/ML
1 SOLUTION/ DROPS OPHTHALMIC 2 TIMES DAILY
Qty: 10 ML | Refills: 0 | Status: SHIPPED | OUTPATIENT
Start: 2025-01-13

## 2025-02-19 DIAGNOSIS — H40.1132 PRIMARY OPEN ANGLE GLAUCOMA OF BOTH EYES, MODERATE STAGE: Primary | ICD-10-CM

## 2025-02-19 RX ORDER — BRIMONIDINE TARTRATE 2 MG/ML
1 SOLUTION/ DROPS OPHTHALMIC 2 TIMES DAILY
Qty: 5 ML | Refills: 3 | Status: SHIPPED | OUTPATIENT
Start: 2025-02-19

## 2025-02-20 DIAGNOSIS — H40.1132 PRIMARY OPEN ANGLE GLAUCOMA OF BOTH EYES, MODERATE STAGE: ICD-10-CM

## 2025-02-21 RX ORDER — DORZOLAMIDE HYDROCHLORIDE AND TIMOLOL MALEATE 20; 5 MG/ML; MG/ML
1 SOLUTION/ DROPS OPHTHALMIC 2 TIMES DAILY
Qty: 10 ML | Refills: 0 | Status: SHIPPED | OUTPATIENT
Start: 2025-02-21

## 2025-04-04 ENCOUNTER — OFFICE VISIT (OUTPATIENT)
Dept: OPHTHALMOLOGY | Facility: CLINIC | Age: 75
End: 2025-04-04
Payer: MEDICARE

## 2025-04-04 DIAGNOSIS — H16.223 KERATITIS SICCA, BILATERAL: ICD-10-CM

## 2025-04-04 DIAGNOSIS — E11.9 DIABETES MELLITUS TYPE 2 WITHOUT RETINOPATHY: ICD-10-CM

## 2025-04-04 DIAGNOSIS — H40.1132 PRIMARY OPEN ANGLE GLAUCOMA OF BOTH EYES, MODERATE STAGE: Primary | ICD-10-CM

## 2025-04-04 DIAGNOSIS — Z96.1 PSEUDOPHAKIA OF BOTH EYES: ICD-10-CM

## 2025-04-04 PROCEDURE — 99999 PR PBB SHADOW E&M-EST. PATIENT-LVL III: CPT | Mod: PBBFAC,,, | Performed by: OPHTHALMOLOGY

## 2025-04-04 NOTE — PROGRESS NOTES
HPI     Glaucoma     Additional comments: Patient states vision is doing well and is using   drops. Denies any pain or discomfort.  No other ocular complaints            Comments    1. Mod COAG OS>OD Goal=15-16 ? OF LTG COMPT.  SLT OD 8/07 (25-16) + 2/15/17 (17.5-15.5) + 10/20/21 (16-13)  SLT OS 2/15/17 (18-16) + 10/20/21 (20-17)  Rhopressa OU qhs-intolerant -fb sensation,redness  ? Steriod Response 17/17 on 12/19 after steroid shot  Xen gel OS 4/17/24  2. Dry eyes  (Xiidra - too expensive)  3. Restor IOL OU w/YAG OS (readers for fine print)  4. DM x 2010  5. Trichiasis OD    Dorzolamide/Timolol BID OD  Vyzulta QHS OD  Brimonidine BID OD  Systane PRN OU  O3FO    Systane rene qhs -per pt  Pred QD OS  Massage QID OS          Last edited by Shine Jeff on 4/4/2025  1:10 PM.            Assessment /Plan     For exam results, see Encounter Report.    Primary open angle glaucoma of both eyes, moderate stage  Doing well, intraocular pressure (IOP) within acceptable range relative to target IOP with no evidence of progression. Continue current treatment. Reviewed importance of continued compliance with treatment and follow up.      Continue current gtts:  Vyzulta one drop in right eye nightly, Brimoninide one drop in right eye every 12 hours, and Dorzolamide/Timolol (Cosopt) one drop in right eye every 12 hours    Prednisolone acetate one drop left eye once daily    RTC in 4 months with IOP check.    Pseudophakia of both eyes  Condition stable, no therapeutic intervention necessary at this time. Will continue to monitor.    Keratitis sicca, bilateral  ATs at least 4 times daily OU    Diabetes mellitus type 2 without retinopathy  No retinopathy last DFE. Next due 11/2025.

## 2025-07-20 DIAGNOSIS — H40.1132 PRIMARY OPEN ANGLE GLAUCOMA OF BOTH EYES, MODERATE STAGE: ICD-10-CM

## 2025-07-21 RX ORDER — DORZOLAMIDE HYDROCHLORIDE AND TIMOLOL MALEATE 20; 5 MG/ML; MG/ML
1 SOLUTION/ DROPS OPHTHALMIC 2 TIMES DAILY
Qty: 10 ML | Refills: 0 | Status: SHIPPED | OUTPATIENT
Start: 2025-07-21

## 2025-08-22 ENCOUNTER — OFFICE VISIT (OUTPATIENT)
Dept: OPHTHALMOLOGY | Facility: CLINIC | Age: 75
End: 2025-08-22
Payer: MEDICARE

## 2025-08-22 DIAGNOSIS — H16.223 KERATITIS SICCA, BILATERAL: ICD-10-CM

## 2025-08-22 DIAGNOSIS — H40.1132 PRIMARY OPEN ANGLE GLAUCOMA OF BOTH EYES, MODERATE STAGE: Primary | ICD-10-CM

## 2025-08-22 PROCEDURE — 99999 PR PBB SHADOW E&M-EST. PATIENT-LVL III: CPT | Mod: PBBFAC,,, | Performed by: OPHTHALMOLOGY
